# Patient Record
Sex: MALE | Race: WHITE | Employment: OTHER | URBAN - METROPOLITAN AREA
[De-identification: names, ages, dates, MRNs, and addresses within clinical notes are randomized per-mention and may not be internally consistent; named-entity substitution may affect disease eponyms.]

---

## 2017-03-14 ENCOUNTER — TELEPHONE (OUTPATIENT)
Dept: CARDIOLOGY CLINIC | Age: 67
End: 2017-03-14

## 2017-04-20 ENCOUNTER — OFFICE VISIT (OUTPATIENT)
Dept: CARDIOLOGY CLINIC | Age: 67
End: 2017-04-20

## 2017-04-20 VITALS
SYSTOLIC BLOOD PRESSURE: 130 MMHG | BODY MASS INDEX: 32.58 KG/M2 | DIASTOLIC BLOOD PRESSURE: 80 MMHG | HEART RATE: 52 BPM | HEIGHT: 68 IN | WEIGHT: 215 LBS

## 2017-04-20 DIAGNOSIS — R06.02 SHORTNESS OF BREATH: ICD-10-CM

## 2017-04-20 DIAGNOSIS — I10 ESSENTIAL HYPERTENSION: Chronic | ICD-10-CM

## 2017-04-20 DIAGNOSIS — E78.2 MIXED HYPERLIPIDEMIA: ICD-10-CM

## 2017-04-20 DIAGNOSIS — I65.23 BILATERAL CAROTID ARTERY STENOSIS: ICD-10-CM

## 2017-04-20 DIAGNOSIS — I42.9 CARDIOMYOPATHY (HCC): ICD-10-CM

## 2017-04-20 DIAGNOSIS — I25.10 CORONARY ARTERY DISEASE INVOLVING NATIVE CORONARY ARTERY OF NATIVE HEART WITHOUT ANGINA PECTORIS: Primary | Chronic | ICD-10-CM

## 2017-04-20 PROCEDURE — 93000 ELECTROCARDIOGRAM COMPLETE: CPT | Performed by: INTERNAL MEDICINE

## 2017-04-20 PROCEDURE — 99214 OFFICE O/P EST MOD 30 MIN: CPT | Performed by: INTERNAL MEDICINE

## 2017-04-20 RX ORDER — CLOPIDOGREL BISULFATE 75 MG/1
75 TABLET ORAL DAILY
Qty: 30 TABLET | Refills: 6 | Status: SHIPPED | OUTPATIENT
Start: 2017-04-20 | End: 2017-05-02

## 2017-04-28 ENCOUNTER — HOSPITAL ENCOUNTER (OUTPATIENT)
Dept: OTHER | Age: 67
Discharge: HOME | End: 2017-04-28
Attending: INTERNAL MEDICINE | Admitting: INTERNAL MEDICINE

## 2017-04-28 DIAGNOSIS — R06.02 SHORTNESS OF BREATH: ICD-10-CM

## 2017-04-28 DIAGNOSIS — I10 ESSENTIAL HYPERTENSION: Chronic | ICD-10-CM

## 2017-04-28 DIAGNOSIS — I42.9 CARDIOMYOPATHY (HCC): ICD-10-CM

## 2017-04-28 LAB
LEFT VENTRICULAR EJECTION FRACTION HIGH VALUE: 55 %
LEFT VENTRICULAR EJECTION FRACTION MODE: NORMAL
LV EF: 55 %
LVEF MODALITY: NORMAL

## 2017-05-02 RX ORDER — ROSUVASTATIN CALCIUM 10 MG/1
TABLET, COATED ORAL
Qty: 30 TABLET | Refills: 11 | Status: SHIPPED | OUTPATIENT
Start: 2017-05-02 | End: 2018-05-25 | Stop reason: SDUPTHER

## 2017-05-02 RX ORDER — CLOPIDOGREL BISULFATE 75 MG/1
75 TABLET ORAL DAILY
Qty: 30 TABLET | Refills: 3 | Status: SHIPPED | OUTPATIENT
Start: 2017-05-02 | End: 2017-08-14 | Stop reason: SDUPTHER

## 2017-05-04 ENCOUNTER — TELEPHONE (OUTPATIENT)
Dept: CARDIOLOGY CLINIC | Age: 67
End: 2017-05-04

## 2017-05-05 RX ORDER — CARVEDILOL 3.12 MG/1
TABLET ORAL
Qty: 60 TABLET | Refills: 11 | Status: SHIPPED | OUTPATIENT
Start: 2017-05-05 | End: 2017-05-08 | Stop reason: SDUPTHER

## 2017-05-08 RX ORDER — CARVEDILOL 3.12 MG/1
TABLET ORAL
Qty: 180 TABLET | Refills: 3 | Status: SHIPPED | OUTPATIENT
Start: 2017-05-08 | End: 2018-05-25 | Stop reason: SDUPTHER

## 2017-06-16 RX ORDER — LOSARTAN POTASSIUM 25 MG/1
TABLET ORAL
Qty: 90 TABLET | Refills: 3 | Status: SHIPPED | OUTPATIENT
Start: 2017-06-16 | End: 2018-08-22 | Stop reason: SDUPTHER

## 2017-07-31 DIAGNOSIS — I65.23 BILATERAL CAROTID ARTERY STENOSIS: Primary | ICD-10-CM

## 2017-08-04 ENCOUNTER — HOSPITAL ENCOUNTER (OUTPATIENT)
Dept: OTHER | Age: 67
Discharge: HOME | End: 2017-08-04
Attending: INTERNAL MEDICINE | Admitting: INTERNAL MEDICINE

## 2017-08-09 ENCOUNTER — TELEPHONE (OUTPATIENT)
Dept: CARDIOLOGY CLINIC | Age: 67
End: 2017-08-09

## 2017-08-14 RX ORDER — PRASUGREL 10 MG/1
TABLET, FILM COATED ORAL
Qty: 30 TABLET | Refills: 6 | Status: SHIPPED | OUTPATIENT
Start: 2017-08-14 | End: 2017-08-23

## 2017-08-14 RX ORDER — CLOPIDOGREL BISULFATE 75 MG/1
75 TABLET ORAL DAILY
Qty: 30 TABLET | Refills: 5 | Status: SHIPPED | OUTPATIENT
Start: 2017-08-14 | End: 2018-03-30 | Stop reason: SDUPTHER

## 2017-08-23 ENCOUNTER — OFFICE VISIT (OUTPATIENT)
Dept: CARDIOLOGY CLINIC | Age: 67
End: 2017-08-23

## 2017-08-23 VITALS
SYSTOLIC BLOOD PRESSURE: 112 MMHG | BODY MASS INDEX: 31.37 KG/M2 | WEIGHT: 207 LBS | HEART RATE: 58 BPM | DIASTOLIC BLOOD PRESSURE: 60 MMHG | HEIGHT: 68 IN

## 2017-08-23 DIAGNOSIS — E78.2 MIXED HYPERLIPIDEMIA: ICD-10-CM

## 2017-08-23 DIAGNOSIS — H34.9 RETINAL ARTERY OCCLUSION: ICD-10-CM

## 2017-08-23 DIAGNOSIS — I25.10 CORONARY ARTERY DISEASE INVOLVING NATIVE CORONARY ARTERY OF NATIVE HEART WITHOUT ANGINA PECTORIS: Primary | Chronic | ICD-10-CM

## 2017-08-23 DIAGNOSIS — I10 ESSENTIAL HYPERTENSION: Chronic | ICD-10-CM

## 2017-08-23 PROCEDURE — 99214 OFFICE O/P EST MOD 30 MIN: CPT | Performed by: INTERNAL MEDICINE

## 2017-08-28 ENCOUNTER — HOSPITAL ENCOUNTER (OUTPATIENT)
Dept: NON INVASIVE DIAGNOSTICS | Age: 67
Discharge: HOME | End: 2017-08-28
Attending: INTERNAL MEDICINE | Admitting: INTERNAL MEDICINE

## 2017-08-28 DIAGNOSIS — H34.9 RETINAL VASCULAR OCCLUSION: ICD-10-CM

## 2017-08-28 LAB
LV EF: 55 %
LVEF MODALITY: NORMAL

## 2017-09-12 ENCOUNTER — TELEPHONE (OUTPATIENT)
Dept: CARDIOLOGY CLINIC | Age: 67
End: 2017-09-12

## 2017-12-04 ENCOUNTER — OFFICE VISIT (OUTPATIENT)
Dept: SLEEP MEDICINE | Age: 67
End: 2017-12-04

## 2017-12-04 VITALS
OXYGEN SATURATION: 98 % | HEIGHT: 68 IN | DIASTOLIC BLOOD PRESSURE: 83 MMHG | SYSTOLIC BLOOD PRESSURE: 136 MMHG | HEART RATE: 65 BPM | BODY MASS INDEX: 31.07 KG/M2 | WEIGHT: 205 LBS

## 2017-12-04 DIAGNOSIS — I25.5 CARDIOMYOPATHY, ISCHEMIC: Chronic | ICD-10-CM

## 2017-12-04 DIAGNOSIS — E66.9 NON MORBID OBESITY, UNSPECIFIED OBESITY TYPE: Chronic | ICD-10-CM

## 2017-12-04 DIAGNOSIS — I10 HYPERTENSION, ESSENTIAL: Chronic | ICD-10-CM

## 2017-12-04 DIAGNOSIS — G47.33 OBSTRUCTIVE SLEEP APNEA SYNDROME: Primary | Chronic | ICD-10-CM

## 2017-12-04 DIAGNOSIS — I25.119 CORONARY ARTERY DISEASE INVOLVING NATIVE CORONARY ARTERY OF NATIVE HEART WITH ANGINA PECTORIS (HCC): Chronic | ICD-10-CM

## 2017-12-04 PROCEDURE — 99214 OFFICE O/P EST MOD 30 MIN: CPT | Performed by: NURSE PRACTITIONER

## 2017-12-04 ASSESSMENT — ENCOUNTER SYMPTOMS
ABDOMINAL DISTENTION: 0
COUGH: 0
SINUS PRESSURE: 0
SHORTNESS OF BREATH: 0
RHINORRHEA: 0
ABDOMINAL PAIN: 0
APNEA: 0

## 2017-12-04 ASSESSMENT — SLEEP AND FATIGUE QUESTIONNAIRES
HOW LIKELY ARE YOU TO NOD OFF OR FALL ASLEEP WHILE SITTING INACTIVE IN A PUBLIC PLACE: 0
HOW LIKELY ARE YOU TO NOD OFF OR FALL ASLEEP IN A CAR, WHILE STOPPED FOR A FEW MINUTES IN TRAFFIC: 0
HOW LIKELY ARE YOU TO NOD OFF OR FALL ASLEEP WHEN YOU ARE A PASSENGER IN A CAR FOR AN HOUR WITHOUT A BREAK: 1
HOW LIKELY ARE YOU TO NOD OFF OR FALL ASLEEP WHILE SITTING QUIETLY AFTER LUNCH WITHOUT ALCOHOL: 1
HOW LIKELY ARE YOU TO NOD OFF OR FALL ASLEEP WHILE WATCHING TV: 1
HOW LIKELY ARE YOU TO NOD OFF OR FALL ASLEEP WHILE SITTING AND TALKING TO SOMEONE: 0
HOW LIKELY ARE YOU TO NOD OFF OR FALL ASLEEP WHILE SITTING AND READING: 2
ESS TOTAL SCORE: 7
HOW LIKELY ARE YOU TO NOD OFF OR FALL ASLEEP WHILE LYING DOWN TO REST IN THE AFTERNOON WHEN CIRCUMSTANCES PERMIT: 2

## 2017-12-04 NOTE — PROGRESS NOTES
using the machine  na   Drowsy when driving  No   Does patient carry a DOT/CDL  No   Does patient carry FAA/Pilots License   No    Any concerns noted with the machine at this time   No       Review of Systems   Constitutional: Negative for appetite change, chills, fatigue and fever. HENT: Negative for congestion, nosebleeds, rhinorrhea and sinus pressure. Respiratory: Negative for apnea, cough and shortness of breath. Cardiovascular: Negative for chest pain and palpitations. Gastrointestinal: Negative for abdominal distention and abdominal pain. Neurological: Negative for dizziness and headaches. Social History     Social History    Marital status:      Spouse name: N/A    Number of children: N/A    Years of education: N/A     Occupational History    Not on file. Social History Main Topics    Smoking status: Never Smoker    Smokeless tobacco: Never Used    Alcohol use No    Drug use: Unknown    Sexual activity: Not on file     Other Topics Concern    Not on file     Social History Narrative    No narrative on file       Prior to Admission medications    Medication Sig Start Date End Date Taking? Authorizing Provider   clopidogrel (PLAVIX) 75 MG tablet Take 1 tablet by mouth daily If pt can not afford Effient he will take Plavix.  8/14/17  Yes Tonya Granda MD   losartan (COZAAR) 25 MG tablet TAKE ONE TABLET BY MOUTH ONCE DAILY 6/16/17  Yes Tonya Granda MD   carvedilol (COREG) 3.125 MG tablet TAKE 1 TABLET BY MOUTH TWICE DAILY WITH MEALS 5/8/17  Yes Tonya Granda MD   rosuvastatin (CRESTOR) 10 MG tablet TAKE ONE TABLET BY MOUTH ONCE DAILY 5/2/17  Yes Tonya Granda MD   levothyroxine (SYNTHROID) 137 MCG tablet 150 mcg  7/14/15  Yes Historical Provider, MD   aspirin 81 MG chewable tablet Take 81 mg by mouth daily   Yes Historical Provider, MD   nitroGLYCERIN (NITROSTAT) 0.4 MG SL tablet Place 1 tablet under the tongue every 5 minutes as needed for Chest pain 7/21/15   Aspen Guerrero MD       Allergies as of 12/04/2017 - Review Complete 12/04/2017   Allergen Reaction Noted    Lipitor [atorvastatin]  07/08/2015    Niacin and related  07/08/2015    Pravachol [pravastatin sodium]  10/07/2016       Patient Active Problem List   Diagnosis    Carotid stenosis    CAD (coronary artery disease)    Hypertension    Hyperlipidemia    Biceps tendinitis    Subacromial impingement of right shoulder    Osteoarthritis, shoulder    Rotator cuff tendonitis    Right shoulder pain    Myocardial infarction within last four weeks    Cardiomyopathy, ischemic    Ischemic cardiomyopathy    Post-traumatic osteoarthritis of right shoulder    Obstructive apnea       Past Medical History:   Diagnosis Date    CAD (coronary artery disease)     Hyperlipidemia     Hypertension     Obstructive apnea     Swallowing difficulty     pt to f/u with an ENT    Thyroid disease        Past Surgical History:   Procedure Laterality Date    CORONARY ANGIOPLASTY WITH STENT PLACEMENT      4 placed    ELBOW SURGERY      2 on right    FOOT SURGERY Left 01/15/2013    ENDOSCOPIC PLANTAR FASCIOTOMY LEFT        FOOT SURGERY Left 01.15.2012    ENDOSCOPIC PLANTAR FASCIOTOMY LEFT        KNEE ARTHROSCOPY      left       Family History   Problem Relation Age of Onset    Heart Failure Mother     Liver Disease Mother     Alzheimer's Disease Mother        Vitals:  Weight BMI   Wt Readings from Last 3 Encounters:   12/04/17 205 lb (93 kg)   08/23/17 207 lb (93.9 kg)   04/20/17 215 lb (97.5 kg)    Body mass index is 31.17 kg/m². BP HR SaO2   BP Readings from Last 3 Encounters:   12/04/17 136/83   08/23/17 112/60   04/20/17 130/80    Pulse Readings from Last 3 Encounters:   12/04/17 65   08/23/17 58   04/20/17 52    SpO2 Readings from Last 3 Encounters:   12/04/17 98%   12/19/16 98%   08/23/16 98%        Assessment:     1. Obstructive sleep apnea syndrome Stable    2.  Coronary artery disease involving native coronary artery of native heart with angina pectoris (HCC) Stable    3. Cardiomyopathy, ischemic Stable    4. Hypertension, essential Stable    5. Non morbid obesity, unspecified obesity type Stable        The chronic medical conditions listed are directly related to the primary diagnosis listed above. The management of the primary diagnosis affects the secondary diagnosis and vice versa. Plan:   - Educated patient and reviewed compliance download with pt.    -Supplies and parts as needed for his machine, these are medically necessary.    - Patient using [employer] for supplies  -Continue medications per his PCP and other physicians.   -Limit caffeine use after 3pm.    -Encouraged him to work on weight loss through diet and exercise. - Will monitor the AHI may be a miss read or machine error  - Psmin 2 Psmax 4 Humidity 3 tube temperature 3  -F/U: 12 month. No orders of the defined types were placed in this encounter. No orders of the defined types were placed in this encounter. No orders of the defined types were placed in this encounter.       Janet Aly, MYNOR, RN, CNP

## 2018-01-23 ENCOUNTER — TELEPHONE (OUTPATIENT)
Dept: CARDIOLOGY CLINIC | Age: 68
End: 2018-01-23

## 2018-01-23 DIAGNOSIS — R06.02 SOB (SHORTNESS OF BREATH): Primary | ICD-10-CM

## 2018-01-23 DIAGNOSIS — I25.10 CORONARY ARTERY DISEASE INVOLVING NATIVE CORONARY ARTERY OF NATIVE HEART WITHOUT ANGINA PECTORIS: Chronic | ICD-10-CM

## 2018-01-23 DIAGNOSIS — R53.83 FATIGUE, UNSPECIFIED TYPE: ICD-10-CM

## 2018-01-24 ENCOUNTER — HOSPITAL ENCOUNTER (OUTPATIENT)
Dept: NON INVASIVE DIAGNOSTICS | Age: 68
Discharge: HOME | End: 2018-01-24
Attending: INTERNAL MEDICINE | Admitting: INTERNAL MEDICINE

## 2018-01-24 DIAGNOSIS — R06.02 SHORTNESS OF BREATH: ICD-10-CM

## 2018-01-24 PROBLEM — I20.0 UNSTABLE ANGINA (HCC): Status: ACTIVE | Noted: 2018-01-24

## 2018-01-24 LAB
LV EF: 47 %
LVEF MODALITY: NORMAL

## 2018-01-24 NOTE — PROGRESS NOTES
Pt had cp on GXT along with arm pain and EKG changes. Dr. Nelida Madrigal reviewed EKGs and cath report in past. Will do myoview stress pictures and do angiogram after stress pictures complete.

## 2018-02-05 ENCOUNTER — TELEPHONE (OUTPATIENT)
Dept: CARDIOTHORACIC SURGERY | Age: 68
End: 2018-02-05

## 2018-02-08 ENCOUNTER — OFFICE VISIT (OUTPATIENT)
Dept: CARDIOTHORACIC SURGERY | Age: 68
End: 2018-02-08

## 2018-02-08 VITALS
WEIGHT: 202 LBS | HEART RATE: 63 BPM | HEIGHT: 68 IN | DIASTOLIC BLOOD PRESSURE: 74 MMHG | SYSTOLIC BLOOD PRESSURE: 122 MMHG | BODY MASS INDEX: 30.62 KG/M2 | OXYGEN SATURATION: 98 % | TEMPERATURE: 98.3 F

## 2018-02-08 DIAGNOSIS — Z95.1 S/P CABG (CORONARY ARTERY BYPASS GRAFT): Primary | ICD-10-CM

## 2018-02-08 PROCEDURE — 99024 POSTOP FOLLOW-UP VISIT: CPT | Performed by: THORACIC SURGERY (CARDIOTHORACIC VASCULAR SURGERY)

## 2018-02-08 NOTE — PROGRESS NOTES
S/p CABG x4 on 1/29/18     Midsternal incision , CT site and L leg incision all healing well,  CT stitch removed without difficulty    Only problem is constipaiton, using laxatives.  Is getting relief    Has appointment with Radha COURTNEY

## 2018-02-08 NOTE — PATIENT INSTRUCTIONS
PREVENTION OF RECURRENT ATHEROSCLEROSIS:  A MESSAGE TO PATIENTS AND THEIR LOVED ONES FROM YOUR SURGEON    You have recently had successful surgery for atherosclerosis. Now your real work begins. Atherosclerosis (hardening of the arteries by cholesterol and fat deposits) can be slowed or stopped from further blocking your own arteries or the new bypass grafts by following \"risk factor management\". If you follow these principles carefully, you can reduce your chances of having heart attacks, strokes, and the need for future surgery. Your cardiologist and primary care doctor should follow these concepts, but your surgeons believe that this is so important that we want you to begin thinking about and following these concepts now. These are the important risk factors you and your doctors should follow carefully. The marked ones apply to YOU:    [] SMOKING: Absolutely positively never smoke again. Keep your home and work place smoke­ free. Your doctors can prescribe patches, gum or other medications to help. [x] BLOOD PRESSURE CONTROL: Your blood pressure should be less than 140/90. If you have diabetes or kidney disease, your blood pressure should be less than 130/80. Weight reduction, exercise and medications can help you control high blood pressure. [x] CHOLESTEROL AND FATS: A diet low in saturated fat (< 7%) and low in cholesterol (< 200 mg/day), and weight reduction, and exercise, and medications as necessary can help you achieve these goals:  Low density (bad) cholesterol: <70 mg/dL (the lower, the better)   Triglycerides: <150 mg/dL (the lower, the better)   High density (good) cholesterol: >40 mg/dL (the higher, the better)  Make an appointment to see your primary care physician, Dr. Felicitas Chavarria 2 months after your surgery to check your cholesterol profile.   [x] EXERCISE: Your cardiac rehabilitation program will help you work up to a regular make you sweat\" program of at least 30 minutes, at least 6 times per week, preferably daily. Make an appointment with your cardiologist Dr. Goyo Iglesias 3-4 weeks after your surgery. [x] WEIGHT REDUCTION: Your ideal body mass index is 18.5-24.9 kg/m2. Your body mass index is Body mass index is 30.71 kg/m². . You may calculate this by using the following formula: (weight in pounds X 0.45) / (height in inches X 0.0254) squared. A waist circumference of < 40 inches for men and < 35 inches for women is recommended. A 10% weight reduction can lower your risk of future events. [] DIABETES: Your HbA1c should be <7%. Diet, exercise, weight reduction and proper medications can reduce your body's tendency toward high blood sugar. Check with your PCP for assistance. [x] PROPER MEDICATIONS:  [x] Aspirin  [x] ACE inhibitor / ARB  (-opril / -sartan)  [x] Beta-blocker (-olol or -ilol)  [x] Statin    Risk factor management works. The person for whom this is most important is YOU. Post this information on your refrigerator or other prominent place as a reminder to you. Please call or write if you have further questions. We want you and your operation to last for many more years.     MAY LIFT MORE THAN 5 LBS ON 3/29/18

## 2018-02-09 ENCOUNTER — HOSPITAL ENCOUNTER (OUTPATIENT)
Dept: OTHER | Age: 68
Discharge: HOME | End: 2018-02-09
Attending: NURSE PRACTITIONER | Admitting: NURSE PRACTITIONER

## 2018-02-09 ENCOUNTER — OFFICE VISIT (OUTPATIENT)
Dept: CARDIOLOGY CLINIC | Age: 68
End: 2018-02-09

## 2018-02-09 VITALS
WEIGHT: 203.8 LBS | DIASTOLIC BLOOD PRESSURE: 70 MMHG | SYSTOLIC BLOOD PRESSURE: 118 MMHG | HEIGHT: 68 IN | BODY MASS INDEX: 30.89 KG/M2 | HEART RATE: 64 BPM

## 2018-02-09 DIAGNOSIS — I10 ESSENTIAL HYPERTENSION, BENIGN: ICD-10-CM

## 2018-02-09 DIAGNOSIS — I25.10 CORONARY ARTERY DISEASE INVOLVING NATIVE CORONARY ARTERY OF NATIVE HEART WITHOUT ANGINA PECTORIS: Primary | Chronic | ICD-10-CM

## 2018-02-09 DIAGNOSIS — I25.10 CORONARY ARTERY DISEASE INVOLVING NATIVE CORONARY ARTERY OF NATIVE HEART WITHOUT ANGINA PECTORIS: Chronic | ICD-10-CM

## 2018-02-09 DIAGNOSIS — E78.2 MIXED HYPERLIPIDEMIA: ICD-10-CM

## 2018-02-09 LAB
ANION GAP SERPL CALCULATED.3IONS-SCNC: 14 MMOL/L (ref 3–16)
BUN BLDV-MCNC: 19 MG/DL (ref 7–20)
CALCIUM SERPL-MCNC: 9.4 MG/DL (ref 8.3–10.6)
CHLORIDE BLD-SCNC: 105 MMOL/L (ref 99–110)
CO2: 23 MMOL/L (ref 21–32)
CREAT SERPL-MCNC: 1.4 MG/DL (ref 0.8–1.3)
GFR AFRICAN AMERICAN: >60
GFR NON-AFRICAN AMERICAN: 50
GLUCOSE BLD-MCNC: 115 MG/DL (ref 70–99)
HCT VFR BLD CALC: 35.4 % (ref 40.5–52.5)
HEMOGLOBIN: 11.7 G/DL (ref 13.5–17.5)
MAGNESIUM: 2.3 MG/DL (ref 1.8–2.4)
MCH RBC QN AUTO: 30.8 PG (ref 26–34)
MCHC RBC AUTO-ENTMCNC: 33 G/DL (ref 31–36)
MCV RBC AUTO: 93.4 FL (ref 80–100)
PDW BLD-RTO: 14.6 % (ref 12.4–15.4)
PLATELET # BLD: 391 K/UL (ref 135–450)
PMV BLD AUTO: 6.7 FL (ref 5–10.5)
POTASSIUM SERPL-SCNC: 4.8 MMOL/L (ref 3.5–5.1)
RBC # BLD: 3.79 M/UL (ref 4.2–5.9)
SODIUM BLD-SCNC: 142 MMOL/L (ref 136–145)
WBC # BLD: 8 K/UL (ref 4–11)

## 2018-02-09 PROCEDURE — 1111F DSCHRG MED/CURRENT MED MERGE: CPT | Performed by: NURSE PRACTITIONER

## 2018-02-09 PROCEDURE — G8484 FLU IMMUNIZE NO ADMIN: HCPCS | Performed by: NURSE PRACTITIONER

## 2018-02-09 PROCEDURE — 1123F ACP DISCUSS/DSCN MKR DOCD: CPT | Performed by: NURSE PRACTITIONER

## 2018-02-09 PROCEDURE — 1036F TOBACCO NON-USER: CPT | Performed by: NURSE PRACTITIONER

## 2018-02-09 PROCEDURE — G8427 DOCREV CUR MEDS BY ELIG CLIN: HCPCS | Performed by: NURSE PRACTITIONER

## 2018-02-09 PROCEDURE — 99214 OFFICE O/P EST MOD 30 MIN: CPT | Performed by: NURSE PRACTITIONER

## 2018-02-09 PROCEDURE — 93000 ELECTROCARDIOGRAM COMPLETE: CPT | Performed by: NURSE PRACTITIONER

## 2018-02-09 PROCEDURE — G8598 ASA/ANTIPLAT THER USED: HCPCS | Performed by: NURSE PRACTITIONER

## 2018-02-09 PROCEDURE — 3017F COLORECTAL CA SCREEN DOC REV: CPT | Performed by: NURSE PRACTITIONER

## 2018-02-09 PROCEDURE — 4040F PNEUMOC VAC/ADMIN/RCVD: CPT | Performed by: NURSE PRACTITIONER

## 2018-02-09 PROCEDURE — G8417 CALC BMI ABV UP PARAM F/U: HCPCS | Performed by: NURSE PRACTITIONER

## 2018-02-09 NOTE — PROGRESS NOTES
pantoprazole (PROTONIX) 40 MG tablet Take 1 tablet by mouth daily 30 tablet 0    furosemide (LASIX) 40 MG tablet Take 1 tablet by mouth daily for 3 days 3 tablet 0    clopidogrel (PLAVIX) 75 MG tablet Take 1 tablet by mouth daily If pt can not afford Effient he will take Plavix. 30 tablet 5    losartan (COZAAR) 25 MG tablet TAKE ONE TABLET BY MOUTH ONCE DAILY 90 tablet 3    carvedilol (COREG) 3.125 MG tablet TAKE 1 TABLET BY MOUTH TWICE DAILY WITH MEALS 180 tablet 3    rosuvastatin (CRESTOR) 10 MG tablet TAKE ONE TABLET BY MOUTH ONCE DAILY 30 tablet 11    levothyroxine (SYNTHROID) 137 MCG tablet 150 mcg       aspirin 81 MG chewable tablet Take 81 mg by mouth daily       No current facility-administered medications for this visit. REVIEW OF SYSTEMS:   CONSTITUTIONAL: No major weight gain or loss, fatigue, weakness, night sweats or fever. There's been no change in energy level, sleep pattern, or activity level. HEENT: No new vision difficulties or ringing in the ears. RESPIRATORY: No new SOB, PND, orthopnea or cough. CARDIOVASCULAR: See HPI  GI: No nausea, vomiting, diarrhea, constipation, abdominal pain or changes in bowel habits. : No urinary frequency, urgency, incontinence hematuria or dysuria. SKIN: No cyanosis or skin lesions. MUSCULOSKELETAL: No new muscle or joint pain. NEUROLOGICAL: No syncope or TIA-like symptoms. PSYCHIATRIC: No anxiety, pain, insomnia or depression    Objective:   PHYSICAL EXAM:        VITALS:    Vitals:    02/09/18 1116   BP: 118/70   Pulse: 64         CONSTITUTIONAL: Cooperative, no apparent distress, and appears well nourished / developed  NEUROLOGIC:  Awake and orientated to person, place and time. PSYCH: Calm affect. SKIN: Warm and dry. HEENT: Sclera non-icteric, normocephalic, neck supple, no elevation of JVP, normal carotid pulses with no bruits and thyroid normal size.   LUNGS:  No increased work of breathing and clear to auscultation, no crackles or evaluation will be based upon the patient's clinical course and testing results. All questions and concerns were addressed to the patient/family. Alternatives to  treatment were discussed. The patient  currently  is not smoking. The risks related to smoking were reviewed with the patient. Recommend maintaining a smoke-free lifestyle. Products available for smoking cessation were discussed. Daily weight, low sodium diet were discussed. Patient instructed to call the office with a weight gain: > 3 lbs over night or 5 lbs in one week; swelling, SOB/orthopnea/PND    Dual Antiplatelet therapy has been prescribed for this patient. Education conducted on adverse reactions including bleeding was discussed. The patient verbalizes understanding. Pt is on a BB  Pt is on an ace-i/ARB  Pt is on a statin    Saturated fat diet discussed  Exercise program discussed    Thank you for allowing to us to participate in the care of Onofre Monique CNP

## 2018-02-12 ENCOUNTER — TELEPHONE (OUTPATIENT)
Dept: SLEEP MEDICINE | Age: 68
End: 2018-02-12

## 2018-02-12 NOTE — TELEPHONE ENCOUNTER
Patient recently had heart surgery and has not been able to wear machine since having the surgery. Patient feels that the pressure is too high now. He would like to come in today to bring his machine in if needed. Please call.

## 2018-02-13 ENCOUNTER — TELEPHONE (OUTPATIENT)
Dept: SLEEP MEDICINE | Age: 68
End: 2018-02-13

## 2018-02-13 NOTE — TELEPHONE ENCOUNTER
Pt in with unit c/o pressure being too much post surgery,  (cardiac bypass). Download was reviewed per  Dennie Gunning NP and changes made to pressure min epap 5- min ps 1- max ps 3.  Pt to call if problems persist

## 2018-03-15 ENCOUNTER — HOSPITAL ENCOUNTER (OUTPATIENT)
Dept: CARDIAC REHAB | Age: 68
Discharge: HOME | End: 2018-03-31
Attending: FAMILY MEDICINE | Admitting: INTERNAL MEDICINE

## 2018-03-30 RX ORDER — CLOPIDOGREL BISULFATE 75 MG/1
TABLET ORAL
Qty: 30 TABLET | Refills: 11 | Status: SHIPPED | OUTPATIENT
Start: 2018-03-30 | End: 2019-01-23 | Stop reason: SDUPTHER

## 2018-04-01 ENCOUNTER — HOSPITAL ENCOUNTER (OUTPATIENT)
Dept: OTHER | Age: 68
Discharge: HOME | End: 2018-04-30
Attending: INTERNAL MEDICINE | Admitting: INTERNAL MEDICINE

## 2018-04-05 ENCOUNTER — OFFICE VISIT (OUTPATIENT)
Dept: CARDIOTHORACIC SURGERY | Age: 68
End: 2018-04-05

## 2018-04-05 VITALS
DIASTOLIC BLOOD PRESSURE: 84 MMHG | SYSTOLIC BLOOD PRESSURE: 130 MMHG | HEIGHT: 68 IN | BODY MASS INDEX: 31.98 KG/M2 | HEART RATE: 63 BPM | OXYGEN SATURATION: 98 % | TEMPERATURE: 98 F | WEIGHT: 211 LBS

## 2018-04-05 DIAGNOSIS — Z95.1 S/P CABG (CORONARY ARTERY BYPASS GRAFT): Primary | ICD-10-CM

## 2018-04-05 PROCEDURE — 99024 POSTOP FOLLOW-UP VISIT: CPT | Performed by: THORACIC SURGERY (CARDIOTHORACIC VASCULAR SURGERY)

## 2018-04-10 ENCOUNTER — TELEPHONE (OUTPATIENT)
Dept: SLEEP MEDICINE | Age: 68
End: 2018-04-10

## 2018-05-01 ENCOUNTER — HOSPITAL ENCOUNTER (OUTPATIENT)
Dept: OTHER | Age: 68
Discharge: HOME | End: 2018-05-31
Attending: INTERNAL MEDICINE | Admitting: INTERNAL MEDICINE

## 2018-05-17 ENCOUNTER — OFFICE VISIT (OUTPATIENT)
Dept: CARDIOLOGY CLINIC | Age: 68
End: 2018-05-17

## 2018-05-17 VITALS
WEIGHT: 206 LBS | HEIGHT: 68 IN | HEART RATE: 60 BPM | DIASTOLIC BLOOD PRESSURE: 80 MMHG | SYSTOLIC BLOOD PRESSURE: 140 MMHG | BODY MASS INDEX: 31.22 KG/M2

## 2018-05-17 DIAGNOSIS — I25.10 CORONARY ARTERY DISEASE INVOLVING NATIVE CORONARY ARTERY OF NATIVE HEART WITHOUT ANGINA PECTORIS: Primary | Chronic | ICD-10-CM

## 2018-05-17 DIAGNOSIS — I10 ESSENTIAL HYPERTENSION, BENIGN: ICD-10-CM

## 2018-05-17 DIAGNOSIS — E78.2 MIXED HYPERLIPIDEMIA: ICD-10-CM

## 2018-05-17 PROCEDURE — 99214 OFFICE O/P EST MOD 30 MIN: CPT | Performed by: INTERNAL MEDICINE

## 2018-05-25 ENCOUNTER — TELEPHONE (OUTPATIENT)
Dept: CARDIOLOGY CLINIC | Age: 68
End: 2018-05-25

## 2018-05-25 RX ORDER — CARVEDILOL 3.12 MG/1
TABLET ORAL
Qty: 180 TABLET | Refills: 3 | Status: SHIPPED | OUTPATIENT
Start: 2018-05-25 | End: 2018-11-14 | Stop reason: SDUPTHER

## 2018-05-25 RX ORDER — ROSUVASTATIN CALCIUM 10 MG/1
10 TABLET, COATED ORAL DAILY
Qty: 90 TABLET | Refills: 3 | Status: SHIPPED | OUTPATIENT
Start: 2018-05-25 | End: 2018-11-14 | Stop reason: SDUPTHER

## 2018-06-01 ENCOUNTER — HOSPITAL ENCOUNTER (OUTPATIENT)
Dept: OTHER | Age: 68
Discharge: HOME | End: 2018-06-30
Attending: INTERNAL MEDICINE | Admitting: INTERNAL MEDICINE

## 2018-07-01 ENCOUNTER — HOSPITAL ENCOUNTER (OUTPATIENT)
Dept: OTHER | Age: 68
Discharge: HOME | End: 2018-07-31
Attending: INTERNAL MEDICINE | Admitting: INTERNAL MEDICINE

## 2018-08-23 RX ORDER — LOSARTAN POTASSIUM 25 MG/1
TABLET ORAL
Qty: 90 TABLET | Refills: 3 | Status: ON HOLD | OUTPATIENT
Start: 2018-08-23 | End: 2019-08-25 | Stop reason: SDUPTHER

## 2018-09-12 ENCOUNTER — OFFICE VISIT (OUTPATIENT)
Dept: CARDIOLOGY CLINIC | Age: 68
End: 2018-09-12

## 2018-09-12 VITALS
HEART RATE: 63 BPM | BODY MASS INDEX: 30.92 KG/M2 | DIASTOLIC BLOOD PRESSURE: 70 MMHG | HEIGHT: 68 IN | WEIGHT: 204 LBS | SYSTOLIC BLOOD PRESSURE: 118 MMHG

## 2018-09-12 DIAGNOSIS — Z95.1 S/P CABG X 4: ICD-10-CM

## 2018-09-12 DIAGNOSIS — R00.2 HEART PALPITATIONS: ICD-10-CM

## 2018-09-12 DIAGNOSIS — I10 ESSENTIAL HYPERTENSION, BENIGN: Primary | ICD-10-CM

## 2018-09-12 PROCEDURE — 3017F COLORECTAL CA SCREEN DOC REV: CPT | Performed by: NURSE PRACTITIONER

## 2018-09-12 PROCEDURE — G8417 CALC BMI ABV UP PARAM F/U: HCPCS | Performed by: NURSE PRACTITIONER

## 2018-09-12 PROCEDURE — G8427 DOCREV CUR MEDS BY ELIG CLIN: HCPCS | Performed by: NURSE PRACTITIONER

## 2018-09-12 PROCEDURE — 93000 ELECTROCARDIOGRAM COMPLETE: CPT | Performed by: NURSE PRACTITIONER

## 2018-09-12 PROCEDURE — 1036F TOBACCO NON-USER: CPT | Performed by: NURSE PRACTITIONER

## 2018-09-12 PROCEDURE — G8598 ASA/ANTIPLAT THER USED: HCPCS | Performed by: NURSE PRACTITIONER

## 2018-09-12 PROCEDURE — 1123F ACP DISCUSS/DSCN MKR DOCD: CPT | Performed by: NURSE PRACTITIONER

## 2018-09-12 PROCEDURE — 99214 OFFICE O/P EST MOD 30 MIN: CPT | Performed by: NURSE PRACTITIONER

## 2018-09-12 PROCEDURE — 4040F PNEUMOC VAC/ADMIN/RCVD: CPT | Performed by: NURSE PRACTITIONER

## 2018-09-12 PROCEDURE — 1101F PT FALLS ASSESS-DOCD LE1/YR: CPT | Performed by: NURSE PRACTITIONER

## 2018-09-12 NOTE — PROGRESS NOTES
Problem: Knowledge Deficit  Goal: Knowledge of disease process/condition, treatment plan, diagnostic tests, and medications will improve  Outcome: PROGRESSING SLOWER THAN EXPECTED  Assess knowledge level of disease process, condition, treatment plan, diagnostic testing, and medications and explain information regarding those subjects.     Problem: Mobility  Goal: Risk for activity intolerance will decrease  Outcome: PROGRESSING SLOWER THAN EXPECTED  Pt has become more lethargic and weak with the medication regime she is on. Improvement today but still weaker than baseline. Will assess and monitor signs of activity intolerance and provide rest periods to patient.        [facility]   Cardiac Evaluation      Patient: Clau Clemons  YOB: 1950  Date: 9/12/18       Chief Complaint   Patient presents with    Coronary Artery Disease    Hyperlipidemia    Hypertension    Palpitations        Referring provider: Eva Mckay MD    History of Present Illness:   Mr Rolo Quinones is seen today for an acute visit. He feels like he has a feeling of palpitations that occur almost daily, mostly when lying still, when doing his karate he does not notice this,  No other symptoms with this. Sveta Lawler He was initially seen for re-evaluation of his coronary disease in 2012. At that time he had 4 stents placed at [facility] in 2005. He states he did not have an MI, did not have chest pain. His symptoms at that time were fatigue. He was transferred to [facility] after having an angiogram at [facility]. Dr Christopher Mishra performed his initial angiogram. Karan's other history includes hypertension and hyperlipidemia. Selvin Castellon states he has had several nuclear stress tests in Dr Karl Packer office since his event in 2005. He also has yearly echo's and carotid dopplers through Dr Donna Davey. He does not smoke or drink alcohol. June 29, 2015 Mr Rolo Quinones was on vacation. He developed chest pain, . He was diagnosed with acute MI and an angiogram was performed with 3 stents placed to LCX and OM2.      angiogram 7/10/15 that revealed EF 40%, LAD - diffuse disease with long stented area in the prox and mid vessel with diffuse instent 40%, distal apical 90%, LCX - 40% ostial, 30% before LCX stent, 40% just prox to large PL branch, bifurcation stents in the LCX-OM3 junction which are patent. OM1 small with 90% ostial, RCA - dom, diffusely diseased, 50%prox, long stented area with localized 60% stenosis, 50% between stents, 2nd stent prox to PDA is patent, PDA - prox 50%. He then had a plain stress test for rehab and participated in rehab. January, 2018 he was admitted to [facility] after a grossly abnormal gxt.  Cath performed pruritis. · Neurological: No headache, change in muscle strength, numbness or tingling. No change in gait, balance, coordination, mood, affect, memory, mentation, behavior. · Psychiatric: anxious   · Endocrine: No malaise or fever  · Hematologic/Lymphatic: No abnormal bruising or bleeding, blood clots or swollen lymph nodes. · Allergic/Immunologic: No nasal congestion or hives. Physical Examination:    Vitals:    09/12/18 1431   BP: 118/70   Site: Left Upper Arm   Position: Sitting   Cuff Size: Medium Adult   Pulse: 63   Weight: 204 lb (92.5 kg)   Height: 5' 8\" (1.727 m)     Body mass index is 31.02 kg/m². Wt Readings from Last 3 Encounters:   09/12/18 204 lb (92.5 kg)   05/17/18 206 lb (93.4 kg)   04/05/18 211 lb (95.7 kg)      BP Readings from Last 3 Encounters:   09/12/18 118/70   05/17/18 (!) 140/80   04/05/18 130/84      Constitutional and General Appearance:  appears stated age  Respiratory:  · Normal excursion and expansion without use of accessory muscles  · Resp Auscultation: Normal breath sounds without dullness  Cardiovascular:  · The apical impulses not displaced  · Heart is regular rate and rhythm with normal S1, S2, EKG today reviewed by me NSR  · The carotid upstroke is normal, no bruit noted   · JVP is not elevated  · Peripheral pulses are symmetrical  · There is no clubbing, cyanosis of the extremities  · No edema  · Femoral Arteries: 2+ and equal  · Pedal Pulses: 2+ and equal   Abdomen:  · No masses or tenderness  · Normal bowel sounds  Neurological/Psychiatric:  · Alert and oriented x3  · Moves all extremities well  · Exhibits normal gait balance and coordination      Assessment/Plan  1. Carotid stenosis: stable, doppler 7/12> <50% stenosis bilateral, repeat doppler 8/4/17: 0% bilateral   2. CAD (coronary artery disease): stable  CABG 1/29/18: CABG x 4 LIMA-LAD, SV-PDA, SV-Diag-OM2  Cath 1/24/18: LVEDP - 5.  LVgram - mild inferior hypokinesis, EF 50%  Cors: LM - nl, LAD - proximal stents (2) with instent stenosis up to 95%, 70% between dx2 and dx3  DX1 - totalled with faint filling, DX2 moderate in caliber, RI - small with 90% ostial, LCX - 40% ostial, 50% between om1 and om2, mid stent and stent in OM2, 40% after mid stent, OM3 70%   RCA - dominant with 50% prox, long stented mid vessel with instent stenoses of 50-80%  Ff by 90% with another stent at RCA/PDA with instent restenosis of 95% Faint r-l collateral to the area of dx1  GXT 1/24/18: medium sized inferolateral fixed defect of moderate intensity consistent w/ infarction in territory of LCx and/or RCA. Small to medium sized anterior completely reversible defect of moderate intensity consistent with ischemia in territory of LAD. Small to medium sized inferolateral completely reversible defect of moderate intensity consistent with ischemia in Lcx and/or RCA. EF 47%. Referred to angiogram  Echo 4/28/17: EF 55%. There is inferior hypokinesis. Mild cLVH. Diastolic filling parameters suggests grade I diastolic dysfunction. Mild mitral annular calcification is present. Aortic valve appears sclerotic but opens adequately. trivial mitral, pulmonic and tricuspid regurgitation with RVSP 25 mmHg. Cath 7/10/15: LVEDP - 16, EF 40%  Cors: LM - nl. LAD - diffuse disease with long stented area in the prox and mid vessel with diffuse instent 40%, distal apical 90%. LCX - 40% ostial, 30% before LCX stent, 40% just prox to large PL branch, bifurcation stents in the LCX-OM3 junction which are patent. OM1 small with 90% ostial  RCA - dom, diffusely diseased, 50%prox, long stented area with localized 60% stenosis, 50% between stents, 2nd stent prox to PDA is patent, PDA - prox 50%  FFR of RCA through all diseased segments was 0.85 ff 2 min of adenosine  Echo 6/2915 [facility]) [id number], mildly reduced EF 50-55%, right ventricle mildly dilated, right atrium mildly dilated, left ventricular posterior hypokinesis  Cath 6/29/15 ([place]): right radial artery approach.

## 2018-09-20 PROCEDURE — 93225 XTRNL ECG REC<48 HRS REC: CPT | Performed by: INTERNAL MEDICINE

## 2018-11-14 RX ORDER — ROSUVASTATIN CALCIUM 10 MG/1
10 TABLET, COATED ORAL DAILY
Qty: 90 TABLET | Refills: 3 | Status: CANCELLED | OUTPATIENT
Start: 2018-11-14

## 2018-11-14 RX ORDER — ROSUVASTATIN CALCIUM 10 MG/1
10 TABLET, COATED ORAL DAILY
Qty: 90 TABLET | Refills: 3 | Status: SHIPPED | OUTPATIENT
Start: 2018-11-14 | End: 2020-03-06

## 2018-11-14 RX ORDER — CARVEDILOL 3.12 MG/1
TABLET ORAL
Qty: 180 TABLET | Refills: 3 | Status: SHIPPED | OUTPATIENT
Start: 2018-11-14 | End: 2019-11-22 | Stop reason: SDUPTHER

## 2018-11-29 ENCOUNTER — OFFICE VISIT (OUTPATIENT)
Dept: CARDIOLOGY CLINIC | Age: 68
End: 2018-11-29
Payer: MEDICARE

## 2018-11-29 VITALS
BODY MASS INDEX: 30.92 KG/M2 | HEART RATE: 62 BPM | WEIGHT: 204 LBS | HEIGHT: 68 IN | SYSTOLIC BLOOD PRESSURE: 136 MMHG | DIASTOLIC BLOOD PRESSURE: 82 MMHG

## 2018-11-29 DIAGNOSIS — G89.29 CHRONIC PAIN OF BOTH KNEES: ICD-10-CM

## 2018-11-29 DIAGNOSIS — M25.562 CHRONIC PAIN OF BOTH KNEES: ICD-10-CM

## 2018-11-29 DIAGNOSIS — I10 ESSENTIAL HYPERTENSION, BENIGN: ICD-10-CM

## 2018-11-29 DIAGNOSIS — M25.561 CHRONIC PAIN OF BOTH KNEES: ICD-10-CM

## 2018-11-29 DIAGNOSIS — I25.5 CARDIOMYOPATHY, ISCHEMIC: Chronic | ICD-10-CM

## 2018-11-29 DIAGNOSIS — E78.2 MIXED HYPERLIPIDEMIA: ICD-10-CM

## 2018-11-29 DIAGNOSIS — I25.10 CORONARY ARTERY DISEASE INVOLVING NATIVE CORONARY ARTERY OF NATIVE HEART WITHOUT ANGINA PECTORIS: Primary | Chronic | ICD-10-CM

## 2018-11-29 PROCEDURE — G8598 ASA/ANTIPLAT THER USED: HCPCS | Performed by: INTERNAL MEDICINE

## 2018-11-29 PROCEDURE — G8427 DOCREV CUR MEDS BY ELIG CLIN: HCPCS | Performed by: INTERNAL MEDICINE

## 2018-11-29 PROCEDURE — G8482 FLU IMMUNIZE ORDER/ADMIN: HCPCS | Performed by: INTERNAL MEDICINE

## 2018-11-29 PROCEDURE — 1036F TOBACCO NON-USER: CPT | Performed by: INTERNAL MEDICINE

## 2018-11-29 PROCEDURE — 1123F ACP DISCUSS/DSCN MKR DOCD: CPT | Performed by: INTERNAL MEDICINE

## 2018-11-29 PROCEDURE — 3017F COLORECTAL CA SCREEN DOC REV: CPT | Performed by: INTERNAL MEDICINE

## 2018-11-29 PROCEDURE — 1101F PT FALLS ASSESS-DOCD LE1/YR: CPT | Performed by: INTERNAL MEDICINE

## 2018-11-29 PROCEDURE — 99214 OFFICE O/P EST MOD 30 MIN: CPT | Performed by: INTERNAL MEDICINE

## 2018-11-29 PROCEDURE — G8417 CALC BMI ABV UP PARAM F/U: HCPCS | Performed by: INTERNAL MEDICINE

## 2018-11-29 PROCEDURE — 4040F PNEUMOC VAC/ADMIN/RCVD: CPT | Performed by: INTERNAL MEDICINE

## 2018-11-29 RX ORDER — RANITIDINE 150 MG/1
150 TABLET ORAL
COMMUNITY
Start: 2018-10-01 | End: 2018-12-03

## 2018-11-29 NOTE — PROGRESS NOTES
[address]   Cardiac Evaluation      Patient: Korey Coronel  YOB: 1950  Date: 11/29/18       Chief Complaint   Patient presents with    Coronary Artery Disease    Hypertension        Referring provider: Harjeet Davey MD    History of Present Illness:   Mr Ruiz is seen today for follow up. He was initially seen for re-evaluation of his coronary disease in 2012. At that time he had 4 stents placed at [facility] in 2005. He states he did not have an MI, did not have chest pain. His symptoms at that time were fatigue. He was transferred to [facility] after having an angiogram at [facility]. Dr Bridgett Anne performed his initial angiogram. Karan's other history includes hypertension and hyperlipidemia. Glen Lyonpavithra Segura states he has had several nuclear stress tests in Dr Emily Marcano office since his event in 2005. He also has yearly echo's and carotid dopplers through Dr Chrissy Abdi. He does not smoke or drink alcohol. June 29, 2015 Mr Ruiz was on vacation. He developed chest pain, . He was diagnosed with acute MI and an angiogram was performed with 3 stents placed to LCX and OM2.      angiogram 7/10/15 that revealed EF 40%, LAD - diffuse disease with long stented area in the prox and mid vessel with diffuse instent 40%, distal apical 90%, LCX - 40% ostial, 30% before LCX stent, 40% just prox to large PL branch, bifurcation stents in the LCX-OM3 junction which are patent. OM1 small with 90% ostial, RCA - dom, diffusely diseased, 50%prox, long stented area with localized 60% stenosis, 50% between stents, 2nd stent prox to PDA is patent, PDA - prox 50%. He then had a plain stress test for rehab and participated in rehab. January, 2018 he was admitted to [facility] after a grossly abnormal gxt. Cath performed then referred for CABG. Dr Jordana Morrison operated 1/29/18 w/ CABG x 4 LIMA-LAD, SV-PDA, SV-Diag-OM2. Today, Mr Ruiz states he has been fine other than knee pain.  He denies any chest pain, palpitations, RUIZ, dizziness, mentation, behavior. · Psychiatric: anxious   · Endocrine: No malaise or fever  · Hematologic/Lymphatic: No abnormal bruising or bleeding, blood clots or swollen lymph nodes. · Allergic/Immunologic: No nasal congestion or hives. Physical Examination:    Vitals:    11/29/18 1122   BP: 136/82   Site: Left Upper Arm   Position: Sitting   Cuff Size: Medium Adult   Pulse: 62   Weight: 204 lb (92.5 kg)   Height: 5' 8\" (1.727 m)     Body mass index is 31.02 kg/m². Wt Readings from Last 3 Encounters:   11/29/18 204 lb (92.5 kg)   09/12/18 204 lb (92.5 kg)   05/17/18 206 lb (93.4 kg)      BP Readings from Last 3 Encounters:   11/29/18 136/82   09/12/18 118/70   05/17/18 (!) 140/80      Constitutional and General Appearance:  appears stated age  Respiratory:  · Normal excursion and expansion without use of accessory muscles  · Resp Auscultation: Normal breath sounds without dullness  Cardiovascular:  · The apical impulses not displaced  · Heart is regular rate and rhythm with normal S1, S2, EKG today reviewed by me NSR  · The carotid upstroke is normal, no bruit noted   · JVP is not elevated  · Peripheral pulses are symmetrical  · There is no clubbing, cyanosis of the extremities  · No edema  · Femoral Arteries: 2+ and equal  · Pedal Pulses: 2+ and equal   Abdomen:  · No masses or tenderness  · Normal bowel sounds  Neurological/Psychiatric:  · Alert and oriented x3  · Moves all extremities well  · Exhibits normal gait balance and coordination      Assessment/Plan  1. Carotid stenosis: stable, doppler 7/12> <50% stenosis bilateral, repeat doppler 8/4/17: 0% bilateral   2. CAD (coronary artery disease): stable  CABG 1/29/18: CABG x 4 LIMA-LAD, SV-PDA, SV-Diag-OM2  Cath 1/24/18: LVEDP - 5.  LVgram - mild inferior hypokinesis, EF 50%  Cors: LM - nl, LAD - proximal stents (2) with instent stenosis up to 95%, 70% between dx2 and dx3  DX1 - totalled with faint filling, DX2 moderate in caliber, RI - small with 90% ostial, LCX - 40% ostial, 50% between om1 and om2, mid stent and stent in OM2, 40% after mid stent, OM3 70%   RCA - dominant with 50% prox, long stented mid vessel with instent stenoses of 50-80%  Ff by 90% with another stent at RCA/PDA with instent restenosis of 95% Faint r-l collateral to the area of dx1  GXT 1/24/18: medium sized inferolateral fixed defect of moderate intensity consistent w/ infarction in territory of LCx and/or RCA. Small to medium sized anterior completely reversible defect of moderate intensity consistent with ischemia in territory of LAD. Small to medium sized inferolateral completely reversible defect of moderate intensity consistent with ischemia in Lcx and/or RCA. EF 47%. Referred to angiogram  Echo 4/28/17: EF 55%. There is inferior hypokinesis. Mild cLVH. Diastolic filling parameters suggests grade I diastolic dysfunction. Mild mitral annular calcification is present. Aortic valve appears sclerotic but opens adequately. trivial mitral, pulmonic and tricuspid regurgitation with RVSP 25 mmHg. Cath 7/10/15: LVEDP - 16, EF 40%  Cors: LM - nl. LAD - diffuse disease with long stented area in the prox and mid vessel with diffuse instent 40%, distal apical 90%. LCX - 40% ostial, 30% before LCX stent, 40% just prox to large PL branch, bifurcation stents in the LCX-OM3 junction which are patent. OM1 small with 90% ostial  RCA - dom, diffusely diseased, 50%prox, long stented area with localized 60% stenosis, 50% between stents, 2nd stent prox to PDA is patent, PDA - prox 50%  FFR of RCA through all diseased segments was 0.85 ff 2 min of adenosine  Echo 6/2915 [facility]) [address], mildly reduced EF 50-55%, right ventricle mildly dilated, right atrium mildly dilated, left ventricular posterior hypokinesis  Cath 6/29/15 ([place]): right radial artery approach. Left main nl, LAD 30% proximal stent and apical 75%, Cx totally occluded.  OM3 50%, RCA 30% in stent and distal 70%, PDA small to moderate with 80% proximal. No LV gram. Aspiration of Cx and OM with 2.5 x12 BERNIE in LCx, 2.75 x14 proximal to first stent in Cx, plaque shift to proximal OM with 2.75 x22 BERNIE to OM  stents x 4 at [facility] in 2005 (do not have records currently)  12/28/12 nuclear stress test> normal perfusion/EF, B/P elevated at rest & during peak exercise   3. Hypertension:   Stable   4. Hyperlipidemia: 1/18: ; TRIG 98; HDL 28; LDL 77, on statin, had aches on 80 mg Pravastatin. changed back to Crestor 10 mg, which he states he is tolerating   5. PVC's -palpitations, holter monitor 9/18: sinus rhythm, PVC's, PAC's. MCOT 11/27/2015 SB, no PVCs    PLAN:  Check lipids and CMP. Encouraged to continue regular exercise. No med changes. FU 6 months. Scribe's attestation: This note was scribed in the presence of Dr Rhonda Patrick MD by Sabas Osullivan RN. The scribe's documentation has been prepared under my direction and personally reviewed by me in its entirety. I confirm that the note above accurately reflects all work, treatment, procedures, and medical decision making performed by me. Thank you for allowing to me to participate in the care of Satira Pallas.

## 2018-12-03 ENCOUNTER — OFFICE VISIT (OUTPATIENT)
Dept: PULMONOLOGY | Age: 68
End: 2018-12-03
Payer: MEDICARE

## 2018-12-03 VITALS
WEIGHT: 205 LBS | SYSTOLIC BLOOD PRESSURE: 128 MMHG | HEART RATE: 69 BPM | BODY MASS INDEX: 31.07 KG/M2 | DIASTOLIC BLOOD PRESSURE: 80 MMHG | OXYGEN SATURATION: 98 % | HEIGHT: 68 IN

## 2018-12-03 DIAGNOSIS — G47.33 OBSTRUCTIVE APNEA: Primary | Chronic | ICD-10-CM

## 2018-12-03 DIAGNOSIS — E66.2 CLASS 1 OBESITY WITH ALVEOLAR HYPOVENTILATION WITHOUT SERIOUS COMORBIDITY WITH BODY MASS INDEX (BMI) OF 31.0 TO 31.9 IN ADULT (HCC): ICD-10-CM

## 2018-12-03 DIAGNOSIS — I10 ESSENTIAL HYPERTENSION, BENIGN: ICD-10-CM

## 2018-12-03 DIAGNOSIS — I25.10 CORONARY ARTERY DISEASE INVOLVING NATIVE CORONARY ARTERY OF NATIVE HEART WITHOUT ANGINA PECTORIS: Chronic | ICD-10-CM

## 2018-12-03 PROBLEM — E66.811 CLASS 1 OBESITY WITH ALVEOLAR HYPOVENTILATION WITHOUT SERIOUS COMORBIDITY WITH BODY MASS INDEX (BMI) OF 31.0 TO 31.9 IN ADULT: Status: ACTIVE | Noted: 2018-12-03

## 2018-12-03 PROCEDURE — 1123F ACP DISCUSS/DSCN MKR DOCD: CPT | Performed by: NURSE PRACTITIONER

## 2018-12-03 PROCEDURE — G8427 DOCREV CUR MEDS BY ELIG CLIN: HCPCS | Performed by: NURSE PRACTITIONER

## 2018-12-03 PROCEDURE — G8598 ASA/ANTIPLAT THER USED: HCPCS | Performed by: NURSE PRACTITIONER

## 2018-12-03 PROCEDURE — 4040F PNEUMOC VAC/ADMIN/RCVD: CPT | Performed by: NURSE PRACTITIONER

## 2018-12-03 PROCEDURE — 1101F PT FALLS ASSESS-DOCD LE1/YR: CPT | Performed by: NURSE PRACTITIONER

## 2018-12-03 PROCEDURE — 3017F COLORECTAL CA SCREEN DOC REV: CPT | Performed by: NURSE PRACTITIONER

## 2018-12-03 PROCEDURE — 1036F TOBACCO NON-USER: CPT | Performed by: NURSE PRACTITIONER

## 2018-12-03 PROCEDURE — G8417 CALC BMI ABV UP PARAM F/U: HCPCS | Performed by: NURSE PRACTITIONER

## 2018-12-03 PROCEDURE — 99214 OFFICE O/P EST MOD 30 MIN: CPT | Performed by: NURSE PRACTITIONER

## 2018-12-03 PROCEDURE — G8482 FLU IMMUNIZE ORDER/ADMIN: HCPCS | Performed by: NURSE PRACTITIONER

## 2018-12-03 ASSESSMENT — SLEEP AND FATIGUE QUESTIONNAIRES
HOW LIKELY ARE YOU TO NOD OFF OR FALL ASLEEP WHILE SITTING QUIETLY AFTER LUNCH WITHOUT ALCOHOL: 0
HOW LIKELY ARE YOU TO NOD OFF OR FALL ASLEEP WHILE SITTING INACTIVE IN A PUBLIC PLACE: 2
HOW LIKELY ARE YOU TO NOD OFF OR FALL ASLEEP IN A CAR, WHILE STOPPED FOR A FEW MINUTES IN TRAFFIC: 0
HOW LIKELY ARE YOU TO NOD OFF OR FALL ASLEEP WHILE LYING DOWN TO REST IN THE AFTERNOON WHEN CIRCUMSTANCES PERMIT: 1
HOW LIKELY ARE YOU TO NOD OFF OR FALL ASLEEP WHILE SITTING AND READING: 2
HOW LIKELY ARE YOU TO NOD OFF OR FALL ASLEEP WHILE WATCHING TV: 1
HOW LIKELY ARE YOU TO NOD OFF OR FALL ASLEEP WHILE SITTING AND TALKING TO SOMEONE: 0
HOW LIKELY ARE YOU TO NOD OFF OR FALL ASLEEP WHEN YOU ARE A PASSENGER IN A CAR FOR AN HOUR WITHOUT A BREAK: 3
ESS TOTAL SCORE: 9

## 2018-12-03 ASSESSMENT — ENCOUNTER SYMPTOMS
SINUS PRESSURE: 0
SHORTNESS OF BREATH: 0
ABDOMINAL PAIN: 0
ABDOMINAL DISTENTION: 0
RHINORRHEA: 0
EYE REDNESS: 0
EYE PAIN: 0
COUGH: 0
APNEA: 0

## 2018-12-03 NOTE — PROGRESS NOTES
Mj Calvillo MD, FAASM, [facility]  Danis Armenta, MSN, RN, CNP     [address] [place] [address] SLEEP MEDICINE  [address]  [facility] [address]  Dept: [phone]  Dept Fax: : [phone] [facility]  [address] [facility] [address] [phone]    Subjective:     Patient ID: Sameer Herzog is a 76 y.o. male. Chief Complaint   Patient presents with    Sleep Apnea       HPI:      Machine Present today:  Yes    Machine Modem/Download Info:  Compliance (hours/night): 6 hrs/night  Download AHI (/hour): 10.8 /HR     Average IPAP Pressure: 9.5 cmH2O  Average EPAP Pressure: 6.9 cmH2O         AUTO BIPAP - Settings (Raquel)  IPAP Max: 25 cmH2O  EPAP Min: 5 cmH2O  Pressure Support Min: 1  Pressure Support Max: 5             Comfort Settings  Humidity Level (0-8): 0  Flex/EPR (0-3): 3       [place] - Total score: 9    Follow-up :     Last Visit : November 2017      Patient reports the listed chronic Co-morbidities: HTN, CAD, obesity    are well controlled and stable at this time. Subjective Health Changes: Had a heart procedure earlier in the year     Over Night Oximetry: [] Yes  [] No  [x] NA   Using O2: [] Yes  [] No  [x] NA   Patient is compliant with the machine  [x] Yes  [] No   Feeling rested when using the machine   [x] Yes  [] No     Pressure is comfortable with inspiration and expiration  [x] Yes  [] No     Noticed changes in pressure   [x] Yes  [] No  [] NA   Mask is fitting well  [x] Yes  [] No   Noting Mask Air Leak  [] Yes  [x] No   Having painful Aerophagia  [] Yes  [x] No   Nocturia   0-1  per night.    Having  HA upon waking  [] Yes  [x] No   Dry mouth upon waking   Dry Nose  Dry Eyes  [x] Yes  [] No   Congestion upon waking   [] Yes  [x] No    Nose Bleeds  [] Yes  [x] No   Using Sleep Aides    [x] NA   Understands how to change humidification and/or tubing temperature for

## 2019-01-23 RX ORDER — CLOPIDOGREL BISULFATE 75 MG/1
TABLET ORAL
Qty: 90 TABLET | Refills: 3 | Status: SHIPPED | OUTPATIENT
Start: 2019-01-23 | End: 2020-02-14

## 2019-04-08 ENCOUNTER — OFFICE VISIT (OUTPATIENT)
Dept: ORTHOPEDIC SURGERY | Age: 69
End: 2019-04-08
Payer: MEDICARE

## 2019-04-08 VITALS — BODY MASS INDEX: 31.07 KG/M2 | HEIGHT: 68 IN | WEIGHT: 205.03 LBS

## 2019-04-08 DIAGNOSIS — M48.02 CERVICAL SPINAL STENOSIS: Primary | ICD-10-CM

## 2019-04-08 DIAGNOSIS — R29.898 WEAKNESS OF LEFT LOWER EXTREMITY: ICD-10-CM

## 2019-04-08 DIAGNOSIS — M10.9 GOUT, UNSPECIFIED CAUSE, UNSPECIFIED CHRONICITY, UNSPECIFIED SITE: ICD-10-CM

## 2019-04-08 DIAGNOSIS — M54.50 LUMBAR PAIN: ICD-10-CM

## 2019-04-08 DIAGNOSIS — M48.062 SPINAL STENOSIS OF LUMBAR REGION WITH NEUROGENIC CLAUDICATION: ICD-10-CM

## 2019-04-08 PROCEDURE — 99203 OFFICE O/P NEW LOW 30 MIN: CPT | Performed by: PHYSICAL MEDICINE & REHABILITATION

## 2019-04-08 PROCEDURE — G8417 CALC BMI ABV UP PARAM F/U: HCPCS | Performed by: PHYSICAL MEDICINE & REHABILITATION

## 2019-04-08 PROCEDURE — 3017F COLORECTAL CA SCREEN DOC REV: CPT | Performed by: PHYSICAL MEDICINE & REHABILITATION

## 2019-04-08 PROCEDURE — G8427 DOCREV CUR MEDS BY ELIG CLIN: HCPCS | Performed by: PHYSICAL MEDICINE & REHABILITATION

## 2019-04-08 RX ORDER — METHYLPREDNISOLONE 4 MG/1
TABLET ORAL
Qty: 1 KIT | Refills: 0 | Status: SHIPPED | OUTPATIENT
Start: 2019-04-08 | End: 2019-06-27 | Stop reason: ALTCHOICE

## 2019-04-08 NOTE — PROGRESS NOTES
New Patient: SPINE    Referring Provider:  Max Castillo MD    CHIEF COMPLAINT:    Chief Complaint   Patient presents with    Back Problem     NP LSP bilateral leg pain. referred by Dr. Torsten Tamez. HISTORY OF PRESENT ILLNESS:      · The patient is being sent at the request of Max Castillo MD in consultation as a new spine patient for low back pain and bilateral leg pain. The patient is a 76 y.o. male whom reports he reports she's had worsening low back pain radiating to both legs. He feels this is worse on the left side. He cannot identify any inciting event or any injury. He is seeing Dr. Kelin Mauricio for her knee pain and is considering a double knee replacement. Reports that his back pain is limiting his overall functioning. His pain is a 6 out of 10 persistent constant aching. This is worse with daily activities including standing and walking. No relieving measures have been found.      Pain Assessment  Location of Pain: Back  Quality of Pain: Aching(stabbing, sharp, spasms)  Duration of Pain: Persistent  Frequency of Pain: Constant  Aggravating Factors: (activity)  Limiting Behavior: Yes  Relieving Factors: Rest  Result of Injury: No  Work-Related Injury: No  Are there other pain locations you wish to document?: No      Associated signs and symptoms:   Neurogenic bowel or bladder symptoms:  no   Perceived weakness:  yes   Difficulty walking:  yes    Recent Imaging (within past one year)   Xrays: no   MRI or CT of spine: no    Current/Past Treatment:   · Physical Therapy:  yes  · Chiropractic:  none  · Injection:  none  · Medications:   NSAIDS:  yes   Muscle relaxer:  none   Steriods:  none   Neuropathic medications:  none   Opioids:  none  · Previous surgery:  no  · Previous surgical consult:  no                Past Medical History:   Past Medical History:   Diagnosis Date    CAD (coronary artery disease)     Hyperlipidemia     Hypertension     Obstructive apnea     Swallowing difficulty pt to f/u with an ENT    Thyroid disease       Past Surgical History:     Past Surgical History:   Procedure Laterality Date    CARDIAC CATHETERIZATION  01/24/2018    Dr. Tamica Aviles - [facility], cor angio    COLONOSCOPY  2011    CORONARY ANGIOPLASTY WITH STENT PLACEMENT  2005, 2015    4 placed at [address] [place] [address] in 2015 (Dr. Sergio Sullivan)   [address] Right 2001, 2004    KNEE ARTHROSCOPY Left 2000    for meniscus tear    PLANTAR FASCIA SURGERY Left 01/15/2013    Dr. Patricia Nicholson, DPM - endoscopic fasciotomy     Current Medications:     Current Outpatient Medications:     methylPREDNISolone (MEDROL, MARCO A,) 4 MG tablet, Take by mouth., Disp: 1 kit, Rfl: 0    clopidogrel (PLAVIX) 75 MG tablet, TAKE 1 TABLET BY MOUTH EVERY DAY, Disp: 90 tablet, Rfl: 3    rosuvastatin (CRESTOR) 10 MG tablet, Take 1 tablet by mouth daily, Disp: 90 tablet, Rfl: 3    carvedilol (COREG) 3.125 MG tablet, TAKE 1 TABLET BY MOUTH TWICE DAILY WITH MEALS, Disp: 180 tablet, Rfl: 3    losartan (COZAAR) 25 MG tablet, TAKE 1 TABLET BY MOUTH EVERY DAY, Disp: 90 tablet, Rfl: 3    levothyroxine (SYNTHROID) 137 MCG tablet, 175 mcg , Disp: , Rfl:     aspirin 81 MG chewable tablet, Take 81 mg by mouth daily, Disp: , Rfl:   Allergies:  Statins and Niacin and related  Social History:    reports that he has never smoked. He has never used smokeless tobacco. He reports that he does not drink alcohol. Family History:   Family History   Problem Relation Age of Onset    Heart Failure Mother     Liver Disease Mother     Alzheimer's Disease Mother          REVIEW OF SYSTEMS: Full ROS reviewed & scanned from 4/8/2019           PHYSICAL EXAM:    Vitals: Height 5' 7.99\" (1.727 m), weight 205 lb 0.4 oz (93 kg). GENERAL EXAM:  · General Apparence: Patient is adequately groomed with no evidence of malnutrition. · Psychiatric: Orientation: The patient is oriented to time, place and person.  The patient's mood and affect are appropriate · Vascular: Examination reveals no swelling and palpation reveals no tenderness in upper or lower extremities. Good capillary refill. · The lymphatic examination of the neck, axillae and groin reveals all areas to be without enlargement or induration   Sensation is intact without deficit in the upper and lower extremities to light touch and pinprick  · Coordination of the upper and lower extremities are normal.    CERVICAL EXAMINATION:  · Inspection: Local inspection shows no step-off or bruising. Cervical alignment is normal. No instability is noted. · Palpation and Percussion: No evidence of tenderness at the midline. Paraspinal tenderness is not present. There is no paraspinal spasm. · Range of Motion:  limited by 25% in all planes due to pain   · Strength: 5/5 bilateral upper extremities  · Special Tests:   Spurling's and Talavera's are + bilaterally   Darden and Impingement tests are negative bilaterally. · Skin:There are no rashes, ulcerations or lesions. · Reflexes: Bilaterally triceps, biceps and brachioradialis are 2+. Clonus absent bilaterally at the feet. No pathological reflexes are noted. · Gait & station: normal, patient ambulates without assistance  · Additional Examinations:  · RIGHT UPPER EXTREMITY:  Inspection/examination of the right upper extremity does not show any tenderness, deformity or injury. Range of motion is normal and pain-free. There is no gross instability. There are no rashes, ulcerations or lesions. Strength and tone are normal. No atrophy or abnormal movements are noted. · LEFT UPPER EXTREMITY: Inspection/examination of the left upper extremity does not show any tenderness, deformity or injury. Range of motion is normal and pain-free. There is no gross instability. There are no rashes, ulcerations or lesions. Strength and tone are normal. No atrophy or abnormal movements are noted.     LUMBAR/SACRAL EXAMINATION:  · Inspection: Local inspection shows no step-off or METABOLIC PANEL   Result Value Ref Range    Sodium 142 136 - 145 mmol/L    Potassium 4.8 3.5 - 5.1 mmol/L    Chloride 105 99 - 110 mmol/L    CO2 23 21 - 32 mmol/L    Anion Gap 14 3 - 16    Glucose 115 (H) 70 - 99 mg/dL    BUN 19 7 - 20 mg/dL    CREATININE 1.4 (H) 0.8 - 1.3 mg/dL    GFR Non-African American 50 (A) >60    GFR African American >60 >60    Calcium 9.4 8.3 - 10.6 mg/dL   MAGNESIUM   Result Value Ref Range    Magnesium 2.30 1.80 - 2.40 mg/dL       Impression (Medical Decision Making):       1. Cervical spinal stenosis    2. Spinal stenosis of lumbar region with neurogenic claudication    3. Gout, unspecified cause, unspecified chronicity, unspecified site    4. Weakness of left lower extremity    5. Lumbar pain        Plan (Medical Decision Making):    I discussed the diagnosis and the treatment options with Carmina Zepeda today. In Summary:  The various treatment options were outlined and discussed with Carmina Zepeda including:  Conservative care options: physical therapy, ice, medications, bracing, and activity modification. The indications for therapeutic injections. The indications for additional imaging/laboratory studies. The indications for (possible future) interventions. After considering the various options discussed, Carmina Zepeda elected to pursue a course of treatment that includes the followin. Medications: I will prescribe a medrol dose pack to help with the inflammatory component of pain. Risks, benefits and alternatives were discussed. Recommended to stop any NSAIDs to reduce risk of GI bleed during the course of the dose pack. 2. PT:  Encouraged to continue with HEP. 3. Further studies:  MR Lumbar spine    4. Interventional:  After further imaging is obtained, interventional options will be reviewed and recommended.     5. Healthy Lifestyle Measures:  Patient education material reviewing the following was distributed to [employer] [employer]/O lifestyle education  Osteoporosis prevention,   Back and neck pain educational information   Advanced imaging preparedness    Posture education   Proper lifting and carrying techniques,   Weight management  Quitting smoking and   Minor ways to treat back pain  For further information regarding the spine conditions and to review interventional treatments the patient was directed to [facility].    6.  Follow up:  1-2 weeks    Hanna Mejias was instructed to call the office if his symptoms worsen or if new symptoms appear prior to the next scheduled visit. He is specifically instructed to contact the office between now & his scheduled appointment if he has concerns related to his condition or if he needs assistance in scheduling the above tests. He is welcome to call for an appointment sooner if he has any additional concerns or questions. Renetta Smyth. Paulina Rockwell MD, MARIA ISABEL, [facility]  Board Certified in [address] [place] [address] Certified and Fellowship Trained in [facility] ([facility])     This dictation was performed with a verbal recognition program [facility]) and it was checked for errors. It is possible that there are still dictated errors within this office note. If so, please bring any errors to my attention for an addendum. All efforts were made to ensure that this office note is accurate.

## 2019-04-17 ENCOUNTER — TELEPHONE (OUTPATIENT)
Dept: ORTHOPEDIC SURGERY | Age: 69
End: 2019-04-17

## 2019-04-17 NOTE — TELEPHONE ENCOUNTER
PATIENT HASN'T HEARD BACK YET AS TO WHETHER OR NOT HIS MRI HAS BEEN APPROVED. HE IS SCHEDULED FOR FU ON 4/22/19 BUT HASN'T EVEN HAD MRI SCHEDULED YET. PLEASE CALL.

## 2019-04-18 ENCOUNTER — HOSPITAL ENCOUNTER (OUTPATIENT)
Dept: MRI IMAGING | Age: 69
Discharge: HOME | End: 2019-04-18
Payer: MEDICARE

## 2019-04-18 DIAGNOSIS — M48.062 SPINAL STENOSIS OF LUMBAR REGION WITH NEUROGENIC CLAUDICATION: ICD-10-CM

## 2019-04-18 DIAGNOSIS — M48.02 CERVICAL SPINAL STENOSIS: ICD-10-CM

## 2019-04-18 DIAGNOSIS — R29.898 WEAKNESS OF LEFT LOWER EXTREMITY: ICD-10-CM

## 2019-04-18 DIAGNOSIS — M10.9 GOUT, UNSPECIFIED CAUSE, UNSPECIFIED CHRONICITY, UNSPECIFIED SITE: ICD-10-CM

## 2019-04-18 PROCEDURE — 72148 MRI LUMBAR SPINE W/O DYE: CPT

## 2019-04-18 PROCEDURE — 72141 MRI NECK SPINE W/O DYE: CPT

## 2019-04-22 ENCOUNTER — OFFICE VISIT (OUTPATIENT)
Dept: ORTHOPEDIC SURGERY | Age: 69
End: 2019-04-22
Payer: MEDICARE

## 2019-04-22 VITALS
HEIGHT: 68 IN | DIASTOLIC BLOOD PRESSURE: 82 MMHG | SYSTOLIC BLOOD PRESSURE: 158 MMHG | WEIGHT: 211.64 LBS | BODY MASS INDEX: 32.08 KG/M2

## 2019-04-22 DIAGNOSIS — M51.36 DDD (DEGENERATIVE DISC DISEASE), LUMBAR: ICD-10-CM

## 2019-04-22 DIAGNOSIS — M48.062 LUMBAR STENOSIS WITH NEUROGENIC CLAUDICATION: ICD-10-CM

## 2019-04-22 DIAGNOSIS — I73.9 VASCULAR CLAUDICATION (HCC): Primary | ICD-10-CM

## 2019-04-22 DIAGNOSIS — M47.816 SPONDYLOSIS OF LUMBAR REGION WITHOUT MYELOPATHY OR RADICULOPATHY: ICD-10-CM

## 2019-04-22 PROCEDURE — 1036F TOBACCO NON-USER: CPT | Performed by: PHYSICAL MEDICINE & REHABILITATION

## 2019-04-22 PROCEDURE — 1123F ACP DISCUSS/DSCN MKR DOCD: CPT | Performed by: PHYSICAL MEDICINE & REHABILITATION

## 2019-04-22 PROCEDURE — G8417 CALC BMI ABV UP PARAM F/U: HCPCS | Performed by: PHYSICAL MEDICINE & REHABILITATION

## 2019-04-22 PROCEDURE — 3017F COLORECTAL CA SCREEN DOC REV: CPT | Performed by: PHYSICAL MEDICINE & REHABILITATION

## 2019-04-22 PROCEDURE — G8427 DOCREV CUR MEDS BY ELIG CLIN: HCPCS | Performed by: PHYSICAL MEDICINE & REHABILITATION

## 2019-04-22 PROCEDURE — 4040F PNEUMOC VAC/ADMIN/RCVD: CPT | Performed by: PHYSICAL MEDICINE & REHABILITATION

## 2019-04-22 PROCEDURE — 99214 OFFICE O/P EST MOD 30 MIN: CPT | Performed by: PHYSICAL MEDICINE & REHABILITATION

## 2019-04-22 PROCEDURE — G8598 ASA/ANTIPLAT THER USED: HCPCS | Performed by: PHYSICAL MEDICINE & REHABILITATION

## 2019-04-22 NOTE — PROGRESS NOTES
Medications:     Current Outpatient Medications:     methylPREDNISolone (MEDROL, MARCO A,) 4 MG tablet, Take by mouth., Disp: 1 kit, Rfl: 0    clopidogrel (PLAVIX) 75 MG tablet, TAKE 1 TABLET BY MOUTH EVERY DAY, Disp: 90 tablet, Rfl: 3    rosuvastatin (CRESTOR) 10 MG tablet, Take 1 tablet by mouth daily, Disp: 90 tablet, Rfl: 3    carvedilol (COREG) 3.125 MG tablet, TAKE 1 TABLET BY MOUTH TWICE DAILY WITH MEALS, Disp: 180 tablet, Rfl: 3    losartan (COZAAR) 25 MG tablet, TAKE 1 TABLET BY MOUTH EVERY DAY, Disp: 90 tablet, Rfl: 3    levothyroxine (SYNTHROID) 137 MCG tablet, 175 mcg , Disp: , Rfl:     aspirin 81 MG chewable tablet, Take 81 mg by mouth daily, Disp: , Rfl:   Allergies:  Statins and Niacin and related  Social History:    reports that he has never smoked. He has never used smokeless tobacco. He reports that he does not drink alcohol. Family History:   Family History   Problem Relation Age of Onset    Heart Failure Mother     Liver Disease Mother     Alzheimer's Disease Mother        REVIEW OF SYSTEMS:   CONSTITUTIONAL: Denies unexplained weight loss, fevers, chills or fatigue  NEUROLOGICAL: Denies unsteady gait or progressive weakness  MUSCULOSKELETAL: Denies joint swelling or redness  GI: Denies nausea, vomiting, diarrhea   : Denies bowel or bladder issues       PHYSICAL EXAM:    Vitals: Blood pressure (!) 158/82, height 5' 7.99\" (1.727 m), weight 211 lb 10.3 oz (96 kg). GENERAL EXAM:  · General Apparence: Patient is adequately groomed with no evidence of malnutrition. · Psychiatric: Orientation: The patient is oriented to time, place and person. The patient's mood and affect are appropriate   · Vascular: Examination reveals no swelling and palpation reveals no tenderness in upper or lower extremities. Good capillary refill.    · The lymphatic examination of the neck, axillae and groin reveals all areas to be without enlargement or induration  · Sensation is intact without deficit in the upper and lower extremities to light touch and pinprick  · Coordination of the upper and lower extremities are normal.    CERVICAL EXAMINATION:  · Inspection: Local inspection shows no step-off or bruising. Cervical alignment is normal. No instability is noted. · Palpation and Percussion: No evidence of tenderness at the midline. Paraspinal tenderness is not present. There is no paraspinal spasm. Skin:There are no rashes, ulcerations or lesions  · Range of Motion:  limited by 25% in all planes due to pain   · Strength: 5/5 bilateral upper extremities  · Special Tests:   Spurling's and Talavera's are negative bilaterally. · Skin:There are no rashes, ulcerations or lesions in right & left upper extremities. · Reflexes: Bilaterally triceps, biceps and brachioradialis are 2+. Clonus absent bilaterally at the feet. No pathological reflexes are noted. · Gait & station: normal, patient ambulates without assistance  · Additional Examinations:  · RIGHT UPPER EXTREMITY:  Inspection/examination of the right upper extremity does not show any tenderness, deformity or injury. Range of motion is unremarkable and pain-free. There is no gross instability. There are no rashes, ulcerations or lesions. Strength and tone are normal. No atrophy or abnormal movements are noted. · LEFT UPPER EXTREMITY: Inspection/examination of the left upper extremity does not show any tenderness, deformity or injury. Range of motion is unremarkable and pain-free. There is no gross instability. There are no rashes, ulcerations or lesions. Strength and tone are normal. No atrophy or abnormal movements are noted. LUMBAR/SACRAL EXAMINATION:  · Inspection: Local inspection shows no step-off or bruising. Lumbar alignment is normal. No instability is noted. · Palpation:   No evidence of tenderness at the midline.   Lumbar paraspinal tenderness: Mild L4/5 and L5/S1 tenderness  Bursal tenderness No tenderness bilaterally  There is no paraspinal spasm.  · Range of Motion: limited by 25% in all planes due to pain  · Strength:   Strength testing is 5/5 in all muscle groups tested. · Special Tests:   Straight leg raise and crossed SLR negative. Ranulfo's testing is negative bilaterally. FADIR's testing is negative bilaterally. · Skin: There are no rashes, ulcerations or lesions. · Reflexes: Reflexes are symmetrically 2+ at the patellar and ankle tendons. Clonus absent bilaterally at the feet. · Gait & station: normal, patient ambulates without assistance  · Additional Examinations:  · RIGHT LOWER EXTREMITY: Inspection/examination of the right lower extremity does not show any tenderness, deformity or injury. Range of motion is normal and pain-free. There is no gross instability. There are no rashes, ulcerations or lesions. Strength and tone are normal. No atrophy or abnormal movements are noted. · LEFT LOWER EXTREMITY:  Inspection/examination of the left lower extremity does not show any tenderness, deformity or injury. Range of motion is normal and pain-free. There is no gross instability. There are no rashes, ulcerations or lesions. Strength and tone are normal. No atrophy or abnormal movements are noted. Diagnostic Testing:    MRI cervical spine:       No acute abnormality in the cervical spine.       Degenerative disc disease as described above.       No spinal canal narrowing.       Foraminal narrowing, mild at right C5-6.       MRI lumbar spine:       No acute abnormality in the lumbar spine.       Degenerative disc disease as described above, exacerbating congenitally   narrow lumbar spinal canal.       Spinal canal narrowing, minimal at L3-4.       Foraminal narrowing, mild at right L4-5, minimal at bilateral L3-4 and left   L4-5.        Results for orders placed or performed during the hospital encounter of 02/09/18   CBC   Result Value Ref Range    WBC 8.0 4.0 - 11.0 K/uL    RBC 3.79 (L) 4.20 - 5.90 M/uL    Hemoglobin 11.7 (L) 13.5 - 17.5 next scheduled visit. He is specifically instructed to contact the office between now & his scheduled appointment if he has concerns related to his condition or if he needs assistance in scheduling the above tests. He is welcome to call for an appointment sooner if he has any additional concerns or questions. Galen Fischer. Mee Dorman MD, MARIA ISABEL, [facility]  Board Certified in [address] [place] [address] Certified and Fellowship Trained in [facility] ([facility])             This dictation was performed with a verbal recognition program [facility]) and it was checked for errors. It is possible that there are still dictated errors within this office note. If so, please bring any errors to my attention for an addendum. All efforts were made to ensure that this office note is accurate.

## 2019-04-25 DIAGNOSIS — M79.10 MUSCLE PAIN: Primary | ICD-10-CM

## 2019-05-03 ENCOUNTER — OFFICE VISIT (OUTPATIENT)
Dept: ORTHOPEDIC SURGERY | Age: 69
End: 2019-05-03
Payer: MEDICARE

## 2019-05-03 ENCOUNTER — TELEPHONE (OUTPATIENT)
Dept: CARDIOLOGY CLINIC | Age: 69
End: 2019-05-03

## 2019-05-03 ENCOUNTER — TELEPHONE (OUTPATIENT)
Dept: ORTHOPEDIC SURGERY | Age: 69
End: 2019-05-03

## 2019-05-03 VITALS
SYSTOLIC BLOOD PRESSURE: 126 MMHG | DIASTOLIC BLOOD PRESSURE: 84 MMHG | HEART RATE: 88 BPM | HEIGHT: 68 IN | BODY MASS INDEX: 32.08 KG/M2 | WEIGHT: 211.64 LBS

## 2019-05-03 DIAGNOSIS — I73.9 VASCULAR CLAUDICATION (HCC): ICD-10-CM

## 2019-05-03 DIAGNOSIS — M51.36 LUMBAR DEGENERATIVE DISC DISEASE: Primary | ICD-10-CM

## 2019-05-03 DIAGNOSIS — M54.16 LUMBAR RADICULOPATHY: ICD-10-CM

## 2019-05-03 PROCEDURE — G8427 DOCREV CUR MEDS BY ELIG CLIN: HCPCS | Performed by: PHYSICAL MEDICINE & REHABILITATION

## 2019-05-03 PROCEDURE — 3017F COLORECTAL CA SCREEN DOC REV: CPT | Performed by: PHYSICAL MEDICINE & REHABILITATION

## 2019-05-03 PROCEDURE — 1123F ACP DISCUSS/DSCN MKR DOCD: CPT | Performed by: PHYSICAL MEDICINE & REHABILITATION

## 2019-05-03 PROCEDURE — 1036F TOBACCO NON-USER: CPT | Performed by: PHYSICAL MEDICINE & REHABILITATION

## 2019-05-03 PROCEDURE — 4040F PNEUMOC VAC/ADMIN/RCVD: CPT | Performed by: PHYSICAL MEDICINE & REHABILITATION

## 2019-05-03 PROCEDURE — G8598 ASA/ANTIPLAT THER USED: HCPCS | Performed by: PHYSICAL MEDICINE & REHABILITATION

## 2019-05-03 PROCEDURE — G8417 CALC BMI ABV UP PARAM F/U: HCPCS | Performed by: PHYSICAL MEDICINE & REHABILITATION

## 2019-05-03 PROCEDURE — 99214 OFFICE O/P EST MOD 30 MIN: CPT | Performed by: PHYSICAL MEDICINE & REHABILITATION

## 2019-05-03 NOTE — PROGRESS NOTES
Follow up: Gm Doty  1950  [id number]      CHIEF COMPLAINT:    Chief Complaint   Patient presents with    Back Problem     fua TR EMG BLE. c/o bilateral leg/ankle swelling. HISTORY OF PRESENT ILLNESS:  Mr. Roseann Higgins is a 71 y.o. male returns for a follow up visit for multiple medical problems. His current presenting problems are   1. Lumbar degenerative disc disease    2. Lumbar radiculopathy    3. Vascular claudication ([address]    . As per information/history obtained from the PADT(patient assessment and documentation tool) - He complains of pain in the lower back with radiation to the lower leg Bilateral He rates the pain 10/10 and describes it as aching, throbbing. Pain is made worse by: walking, standing. He denies side effects from the current pain regimen. Patient reports that since the last follow up visit the physical functioning is unchanged, family/social relationships are unchanged, mood is unchanged and sleep patterns are unchanged, and that the overall functioning is unchanged. Patient denies neurological bowel or bladder. Patient returns today for review of his bilateral lower extremity EMG testing. He and his wife state that over the last 2 months his legs, particularly his ankles, have begun to swell. This tends to be worse at the end of the day. The patient continues to be frustrated by his pain levels and lack of relief. He denies any new injuries or triggers since his last visit. He is not ambulating with any assistive devices in the office today.      Associated signs and symptoms:   Neurogenic bowel or bladder symptoms:  no   Perceived weakness:  yes   Difficulty walking:  yes              Past Medical History:   Past Medical History:   Diagnosis Date    CAD (coronary artery disease)     Hyperlipidemia     Hypertension     Obstructive apnea     Swallowing difficulty     pt to f/u with an ENT    Thyroid disease       Past Surgical History:     Past Surgical with no evidence of malnutrition. · Psychiatric: Orientation: The patient is oriented to time, place and person. The patient's mood and affect are appropriate   · Vascular: Examination reveals swelling in both feet and palpation reveals no tenderness in upper or lower extremities. Good capillary refill. · The lymphatic examination of the neck, axillae and groin reveals all areas to be without enlargement or induration  · Sensation is intact without deficit in the upper and lower extremities to light touch and pinprick  · Coordination of the upper and lower extremities are normal.    CERVICAL EXAMINATION:  · Inspection: Local inspection shows no step-off or bruising. Cervical alignment is normal. No instability is noted. · Palpation and Percussion: No evidence of tenderness at the midline. Paraspinal tenderness is not present. There is no paraspinal spasm. Skin:There are no rashes, ulcerations or lesions  · Range of Motion:  pain-free ROM   · Strength: 5/5 bilateral upper extremities  · Special Tests:   Spurling's and Talavera's are negative bilaterally. Darden and Impingement tests are negative bilaterally. · Skin:There are no rashes, ulcerations or lesions in right & left upper extremities. · Reflexes: Bilaterally triceps, biceps and brachioradialis are 2+. Clonus absent bilaterally at the feet. No pathological reflexes are noted. · Gait & station: normal, patient ambulates without assistance  · Additional Examinations:  · RIGHT UPPER EXTREMITY:  Inspection/examination of the right upper extremity does not show any tenderness, deformity or injury. Range of motion is unremarkable and pain-free. There is no gross instability. There are no rashes, ulcerations or lesions. Strength and tone are normal. No atrophy or abnormal movements are noted. · LEFT UPPER EXTREMITY: Inspection/examination of the left upper extremity does not show any tenderness, deformity or injury.  Range of motion is unremarkable and pain-free. There is no gross instability. There are no rashes, ulcerations or lesions. Strength and tone are normal. No atrophy or abnormal movements are noted. LUMBAR/SACRAL EXAMINATION:  · Inspection: Local inspection shows no step-off or bruising. Lumbar alignment is normal. No instability is noted. · Palpation:   No evidence of tenderness at the midline. Lumbar paraspinal tenderness: Mild L4/5 and L5/S1 tenderness  Bursal tenderness No tenderness bilaterally  There is no paraspinal spasm. · Range of Motion: limited by 25% in all planes due to pain  · Strength:   Strength testing is 5/5 in all muscle groups tested. · Special Tests:   Straight leg raise and crossed SLR negative. · Skin: There are no rashes, ulcerations or lesions. · Reflexes: Reflexes are symmetrically 2+ at the patellar and ankle tendons. Clonus absent bilaterally at the feet. · Gait & station: normal, patient ambulates without assistance  · Additional Examinations:  · RIGHT LOWER EXTREMITY: Inspection/examination of the right lower extremity does not show any tenderness, deformity or injury. Range of motion is normal and pain-free. There is no gross instability. There are no rashes, ulcerations or lesions. Strength and tone are normal. No atrophy or abnormal movements are noted. · LEFT LOWER EXTREMITY:  Inspection/examination of the left lower extremity does not show any tenderness, deformity or injury. Range of motion is normal and pain-free. There is no gross instability. There are no rashes, ulcerations or lesions. Strength and tone are normal. No atrophy or abnormal movements are noted.       Diagnostic Testing:    EMG shows a left S1 Radiculopathy from 3/15/19  Results for orders placed or performed during the hospital encounter of 02/09/18   CBC   Result Value Ref Range    WBC 8.0 4.0 - 11.0 K/uL    RBC 3.79 (L) 4.20 - 5.90 M/uL    Hemoglobin 11.7 (L) 13.5 - 17.5 g/dL    Hematocrit 35.4 (L) 40.5 - 52.5 %    MCV 93.4 80.0 - 100.0 fL    MCH 30.8 26.0 - 34.0 pg    MCHC 33.0 31.0 - 36.0 g/dL    RDW 14.6 12.4 - 15.4 %    Platelets 540 447 - 230 K/uL    MPV 6.7 5.0 - 10.5 fL   BASIC METABOLIC PANEL   Result Value Ref Range    Sodium 142 136 - 145 mmol/L    Potassium 4.8 3.5 - 5.1 mmol/L    Chloride 105 99 - 110 mmol/L    CO2 23 21 - 32 mmol/L    Anion Gap 14 3 - 16    Glucose 115 (H) 70 - 99 mg/dL    BUN 19 7 - 20 mg/dL    CREATININE 1.4 (H) 0.8 - 1.3 mg/dL    GFR Non-African American 50 (A) >60    GFR African American >60 >60    Calcium 9.4 8.3 - 10.6 mg/dL   MAGNESIUM   Result Value Ref Range    Magnesium 2.30 1.80 - 2.40 mg/dL     Impression:       1. Lumbar degenerative disc disease    2. Lumbar radiculopathy    3. Vascular claudication [facility])        Plan:  Clinical Course: Above diagnoses are worsening    I discussed the diagnosis and the treatment options with [employer] today. In Summary:  The various treatment options were outlined and discussed with [employer] including:  Conservative care options: physical therapy, ice, medications, bracing, and activity modification. The indications for therapeutic injections. The indications for additional imaging/laboratory studies. The indications for (possible future) interventions. After considering the various options discussed, [employer] elected to pursue a course of treatment that includes the followin. Medications:  No further recommendations for new medications. 2. PT:  Encouraged to continue with HEP. 3. Further studies:  Referral to Vascular specialist    4. Interventional:  We discussed pursuing a Left L5 and S1 TF epidural steroid injection to address the pain. Radiologic imaging and symptoms confirm the pain etiology. Risks, benefits and alternatives of interventional options were discussed. These include and are not limited to bleeding, infection, spinal headache, nerve injury and lack of pain relief.   The patient verbalized understanding and would like to proceed. The patient will be scheduled accordingly. 5. Follow up:  2-3 weeks      Carline Melgar was instructed to call the office if his symptoms worsen or if new symptoms appear prior to the next scheduled visit. He is specifically instructed to contact the office between now & his scheduled appointment if he has concerns related to his condition or if he needs assistance in scheduling the above tests. He is welcome to call for an appointment sooner if he has any additional concerns or questions. Adeline Velásquez ATC, am scribing for and in the presence of Dr. Alis Berumen. 05/03/19 10:06 AM Trena Flores ATC    I, Dr. Mary Ann Bradford, personally performed the services described in this documentation as scribed by AGUEDA Garcia in my presence and it is both accurate and complete. Anu Abernathy. Ventura Hector MD, MARIA ISABEL, [facility]  Board Certified in [address] [place] [address] Certified and Fellowship Trained in [facility] ([facility])             This dictation was performed with a verbal recognition program [facility] ) and it was checked for errors. It is possible that there are still dictated errors within this office note. If so, please bring any errors to my attention for an addendum. All efforts were made to ensure that this office note is accurate.

## 2019-05-03 NOTE — TELEPHONE ENCOUNTER
L/m for approval to hold PLAVIX for 7 days prior to [facility] on 5/15/2019 BUT STAY ON ASA. Patient aware of hold date.

## 2019-05-03 NOTE — TELEPHONE ENCOUNTER
I spoke with an associate at [facility], to let them know [id number] will be out till Monday and will be addressed then. She verbalized understanding.

## 2019-05-03 NOTE — TELEPHONE ENCOUNTER
Pt is scheduled for Lower lumber epidural injection on 5-15-16. Pt needs to be off Plavix for 7 days prior but stay on his ASA. Pls call/fax to advise. Thank you.

## 2019-05-03 NOTE — LETTER
Please schedule the following with:     Date:   5/15/2019 @ 9:30     Account: [de-identified]  Patient: Shaw Clemente    : 1950  Address:  [address]  [place] [address]    Phone (H):  [phone] (home)      ----------------------------------------------------------------------------------------------  Diagnosis:     ICD-10-CM    1. Lumbar degenerative disc disease M51.36    2. Lumbar radiculopathy M54.16    3. Vascular claudication [facility]) I73.9 [facility] Vascular Surgery         Levels:L5, S1  Procedure type TRANSFORAMINAL  Side LEFT  CPT Codes [id number], 14937    ----------------------------------------------------------------------------------------------  Injection #   [id number]    Attending Physician       Mk Wise.  Sivan Espana MD.      ----------------------------------------------------------------------------------------------  Injection Scheduled For:    At:    1st [facility]    Pre-Cert#    2nd [address]    Pre-Cert#    Comments or Special instructions:    · Infection control  · Tested positive for MRSA in past 12 months:  no  · Tested positive for MSSA \"staph infection\" in past 12 months: no  · Tested positive for VRE (Vancomycin Resistant Enterococci) in past 12 months:   no  · Currently on any antibiotics for an infection: no  · Anticoagulants:  · On a blood thinner:  ASA DO NOT HOLD AND PLAVIX HOLD 7 DAYS DR Elke Pitts [phone]   · Any history of bleeding disorder: no   · Advanced Liver disease: no   · Advanced Renal disease: no   · Glaucoma: no   · Diabetes: no     Sedation:  No  -----------------------------------------------------------------------------------------------  Allergies   Allergen Reactions    Statins Other (See Comments)     Atorvastatin & pravastatin: myalgia    Niacin And Related

## 2019-05-06 NOTE — TELEPHONE ENCOUNTER
Spoke to Pt and gave instructions. He did stop the Crestor and there was no improvement. He is back on the Crestor. A letter will be faxed to Dr office.

## 2019-05-06 NOTE — TELEPHONE ENCOUNTER
It is ok to hold the plavix for 7 days. Tell him all his labs were normal. Ask if being off the statin helped. I doubt it would have. If not, he needs to restart the statin.

## 2019-05-07 NOTE — PROGRESS NOTES
PATIENT REACHED   YES____NO_X___    PREOP INSTUCTIONS LEFT ON VM MDEKOX__[phone]_____________      DATE__5/15/19_______ TIME__0930_______ARRIVAL_0830_______PLACE__masc__________  NOTHING TO EAT OR DRINK  6 HOURS PRIOR TO PROCEDURE START TIME  YOU NEED A RESPONSIBLE ADULT AGE 18 OR OLDER TO DRIVE YOU HOME  PLEASE BRING INSURANCE CARD. PICTURE ID AND COMPLETE LIST OF MEDS  WEAR LOOSE COMFORTABLE CLOTHING  FOLLOW ANY INSTRUCTIONS YOUR DRS OFFICE HAS GIVEN YOU,INCLUDING WHAT MEDICATIONS TO TAKE THE AM OF PROCEDURE AND WHEN AND IF YOU NEED TO STOP ANY BLOOD THINNERS. IF YOU HAVE QUESTIONS REGARDING THIS CALL THE OFFICE  THE GOAL BLOOD SUGAR THE AM OF PROCEDURE  OR LESS ABOVE THAT THE PROCEDURE MAY BE CANCELLED  ANY QUESTIONS CALL YOUR DOCTOR. ALSO,PLEASE READ THE INSTRUCTION PACKET FROM YOUR DR IF YOU RECEIVED ONE.   SPINE INTERVENTION NUMBER [id number]

## 2019-05-09 ENCOUNTER — TELEPHONE (OUTPATIENT)
Dept: ORTHOPEDIC SURGERY | Age: 69
End: 2019-05-09

## 2019-05-09 NOTE — TELEPHONE ENCOUNTER
DOS   05/15/2019  CPT   51744  47742  48715.26  [id number]  OP SX AUTH  NPR    LEFT  LEVELS   L5 - S1   PROCEDURE   TRANSFORAMINAL ARIA INJ    [address]:   MEDICARE

## 2019-05-13 ENCOUNTER — HOSPITAL ENCOUNTER (OUTPATIENT)
Dept: VASCULAR LAB | Age: 69
Discharge: HOME | End: 2019-05-13
Payer: MEDICARE

## 2019-05-13 DIAGNOSIS — I73.9 VASCULAR CLAUDICATION (HCC): ICD-10-CM

## 2019-05-13 PROCEDURE — 93925 LOWER EXTREMITY STUDY: CPT

## 2019-05-15 ENCOUNTER — HOSPITAL ENCOUNTER (OUTPATIENT)
Age: 69
Setting detail: OUTPATIENT SURGERY
Discharge: HOME | End: 2019-05-15
Attending: PHYSICAL MEDICINE & REHABILITATION | Admitting: PHYSICAL MEDICINE & REHABILITATION
Payer: MEDICARE

## 2019-05-15 ENCOUNTER — APPOINTMENT (OUTPATIENT)
Dept: GENERAL RADIOLOGY | Age: 69
End: 2019-05-15
Attending: PHYSICAL MEDICINE & REHABILITATION
Payer: MEDICARE

## 2019-05-15 VITALS
DIASTOLIC BLOOD PRESSURE: 88 MMHG | RESPIRATION RATE: 16 BRPM | OXYGEN SATURATION: 100 % | HEIGHT: 68 IN | TEMPERATURE: 97.1 F | SYSTOLIC BLOOD PRESSURE: 118 MMHG | BODY MASS INDEX: 32.18 KG/M2 | HEART RATE: 54 BPM

## 2019-05-15 PROCEDURE — 3209999900 FLUORO FOR SURGICAL PROCEDURES

## 2019-05-15 PROCEDURE — 6360000002 HC RX W HCPCS: Performed by: PHYSICAL MEDICINE & REHABILITATION

## 2019-05-15 PROCEDURE — 99152 MOD SED SAME PHYS/QHP 5/>YRS: CPT | Performed by: PHYSICAL MEDICINE & REHABILITATION

## 2019-05-15 PROCEDURE — 2500000003 HC RX 250 WO HCPCS: Performed by: PHYSICAL MEDICINE & REHABILITATION

## 2019-05-15 PROCEDURE — 2709999900 HC NON-CHARGEABLE SUPPLY: Performed by: PHYSICAL MEDICINE & REHABILITATION

## 2019-05-15 PROCEDURE — 3610000056 HC PAIN LEVEL 4 BASE (NON-OR): Performed by: PHYSICAL MEDICINE & REHABILITATION

## 2019-05-15 RX ORDER — LIDOCAINE HYDROCHLORIDE 10 MG/ML
INJECTION, SOLUTION INFILTRATION; PERINEURAL
Status: COMPLETED | OUTPATIENT
Start: 2019-05-15 | End: 2019-05-15

## 2019-05-15 RX ORDER — METHYLPREDNISOLONE ACETATE 80 MG/ML
INJECTION, SUSPENSION INTRA-ARTICULAR; INTRALESIONAL; INTRAMUSCULAR; SOFT TISSUE
Status: COMPLETED | OUTPATIENT
Start: 2019-05-15 | End: 2019-05-15

## 2019-05-15 RX ORDER — BUPIVACAINE HYDROCHLORIDE 5 MG/ML
INJECTION, SOLUTION PERINEURAL
Status: COMPLETED | OUTPATIENT
Start: 2019-05-15 | End: 2019-05-15

## 2019-05-15 RX ORDER — MIDAZOLAM HYDROCHLORIDE 1 MG/ML
INJECTION INTRAMUSCULAR; INTRAVENOUS
Status: COMPLETED | OUTPATIENT
Start: 2019-05-15 | End: 2019-05-15

## 2019-05-15 ASSESSMENT — PAIN SCALES - GENERAL
PAINLEVEL_OUTOF10: 0
PAINLEVEL_OUTOF10: 0

## 2019-05-15 ASSESSMENT — PAIN - FUNCTIONAL ASSESSMENT: PAIN_FUNCTIONAL_ASSESSMENT: 0-10

## 2019-05-15 NOTE — H&P
HISTORY AND PHYSICAL/PRE-SEDATION ASSESSMENT    Patient:  Parvez Eye   :  1950  Medical Record No.:  [id number]   Date:  5/15/2019  Physician:  Natalia Hutton M.D. Facility: [address] [place] [address]    HISTORY OF PRESENT ILLNESS:                 The patient is a 71 y.o. male whom presents with lower back and bilateral leg pain. Review of the imaging and physical exam of the patient confirmed the pre-procedure diagnosis. After a thorough discussion of risks, benefits and alternatives informed consent was obtained. Past Medical History:   Past Medical History:   Diagnosis Date    CAD (coronary artery disease)     Hyperlipidemia     Hypertension     Obstructive apnea     Swallowing difficulty     pt to f/u with an ENT    Thyroid disease       Past Surgical History:     Past Surgical History:   Procedure Laterality Date    CARDIAC CATHETERIZATION  2018    Dr. Kiana Huff - [facility], cor angio    COLONOSCOPY      CORONARY ANGIOPLASTY WITH STENT PLACEMENT  ,     4 placed at [address] [place] [address] in  (Dr. Charity Casanova)   [address] Right ,     KNEE ARTHROSCOPY Left     for meniscus tear    PLANTAR FASCIA SURGERY Left 01/15/2013    Dr. Jeanne Woodruff, DPM - endoscopic fasciotomy     Current Medications:   Prior to Admission medications    Medication Sig Start Date End Date Taking?  Authorizing Provider   rosuvastatin (CRESTOR) 10 MG tablet Take 1 tablet by mouth daily 18  Yes Wes Huggins MD   carvedilol (COREG) 3.125 MG tablet TAKE 1 TABLET BY MOUTH TWICE DAILY WITH MEALS 18  Yes Wes Huggins MD   losartan (COZAAR) 25 MG tablet TAKE 1 TABLET BY MOUTH EVERY DAY 18  Yes Wes Huggins MD   levothyroxine (SYNTHROID) 137 MCG tablet 175 mcg  7/14/15  Yes Historical Provider, MD   aspirin 81 MG chewable tablet Take 81 mg by mouth daily   Yes Historical Provider, MD   methylPREDNISolone (MEDROL, MARCO A,) 4 MG tablet Take by mouth. 4/8/19   Veronica Haley MD   clopidogrel (PLAVIX) 75 MG tablet TAKE 1 TABLET BY MOUTH EVERY DAY 1/23/19   Namrata Jiménez MD     Allergies:  Statins and Niacin and related  Social History:    reports that he has never smoked. He has never used smokeless tobacco. He reports that he does not drink alcohol. Family History:   Family History   Problem Relation Age of Onset    Heart Failure Mother     Liver Disease Mother     Alzheimer's Disease Mother        Vitals: Blood pressure 133/88, pulse 55, temperature 97.1 °F (36.2 °C), temperature source Temporal, resp. rate 20, height 5' 8\" (1.727 m), SpO2 99 %. PHYSICAL EXAM:including affected areas  HENT: Airway patent and reviewed  Cardiovascular: Normal rate, regular rhythm, normal heart sounds. Pulmonary/Chest: No wheezes. No rhonchi. No rales. Abdominal: Soft. Bowel sounds are normal. No distension. Extremities: Moves all extremities equally  Cervical and Lumbar Spine: Painful range of motion, no midline tenderness       Diagnosis:Lumbar radiculopathy  M51.36  M54.16  I73.9    Plan: Proceed with planned procedure      ASA CLASS:         []   I. Normal, healthy adult           [x]   II.  Mild systemic disease            []   III. Severe systemic disease      Mallampati: Mallampati Class II - (soft palate, fauces & uvula are visible)      Sedation plan:   [x]  Local              []  Minimal                  []  General anesthesia    Patient's condition acceptable for planned procedure/sedation. Post Procedure Plan   Return to same level of care   ______________________     The risks and benefits as well as alternatives to the procedure have been discussed with the patient and or family. The patient and or next of kin understands and agrees to proceed.     Veronica Haley M.D.

## 2019-05-16 ENCOUNTER — TELEPHONE (OUTPATIENT)
Dept: ORTHOPEDIC SURGERY | Age: 69
End: 2019-05-16

## 2019-05-16 NOTE — TELEPHONE ENCOUNTER
S/W PATIENT, lower extremity doppler results were normal. Patient voiced understanding of results. He will contact the office with further questions or concerns.

## 2019-05-20 ENCOUNTER — TELEPHONE (OUTPATIENT)
Dept: ORTHOPEDIC SURGERY | Age: 69
End: 2019-05-20

## 2019-05-20 NOTE — TELEPHONE ENCOUNTER
PT CALLED IN STATING HE HAD AN EPIDURAL ON 05/15 AND IT HAS NOT HELPED AS OF YET. HE WOULD LIKE TO KNOW IF IT WOULD BE OK TO GET A MASSAGE. PLEASE CALL PT [phone].

## 2019-05-20 NOTE — TELEPHONE ENCOUNTER
Yes this is okay for the patient to do. Let him know that sometimes it takes up to seven days to see increased relief of the ARIA.

## 2019-06-04 ENCOUNTER — OFFICE VISIT (OUTPATIENT)
Dept: ORTHOPEDIC SURGERY | Age: 69
End: 2019-06-04
Payer: MEDICARE

## 2019-06-04 VITALS — HEIGHT: 68 IN | BODY MASS INDEX: 32.08 KG/M2 | WEIGHT: 211.64 LBS

## 2019-06-04 DIAGNOSIS — M54.16 LUMBAR RADICULOPATHY: Primary | ICD-10-CM

## 2019-06-04 DIAGNOSIS — M48.061 LUMBAR FORAMINAL STENOSIS: ICD-10-CM

## 2019-06-04 PROCEDURE — 3017F COLORECTAL CA SCREEN DOC REV: CPT | Performed by: PHYSICAL MEDICINE & REHABILITATION

## 2019-06-04 PROCEDURE — 1123F ACP DISCUSS/DSCN MKR DOCD: CPT | Performed by: PHYSICAL MEDICINE & REHABILITATION

## 2019-06-04 PROCEDURE — G8427 DOCREV CUR MEDS BY ELIG CLIN: HCPCS | Performed by: PHYSICAL MEDICINE & REHABILITATION

## 2019-06-04 PROCEDURE — G8417 CALC BMI ABV UP PARAM F/U: HCPCS | Performed by: PHYSICAL MEDICINE & REHABILITATION

## 2019-06-04 PROCEDURE — 4040F PNEUMOC VAC/ADMIN/RCVD: CPT | Performed by: PHYSICAL MEDICINE & REHABILITATION

## 2019-06-04 PROCEDURE — 99213 OFFICE O/P EST LOW 20 MIN: CPT | Performed by: PHYSICAL MEDICINE & REHABILITATION

## 2019-06-04 PROCEDURE — G8598 ASA/ANTIPLAT THER USED: HCPCS | Performed by: PHYSICAL MEDICINE & REHABILITATION

## 2019-06-04 PROCEDURE — 1036F TOBACCO NON-USER: CPT | Performed by: PHYSICAL MEDICINE & REHABILITATION

## 2019-06-04 NOTE — PROGRESS NOTES
Follow up: Felicita Lange  1950  [id number]         Chief Complaint   Patient presents with    Back Pain     fua 5/15 TF L L5, L S1. symptoms improved. HISTORY OF PRESENT ILLNESS:  Mr. Rashad Vargas is a 71 y.o. male returns for a follow up visit for multiple medical problems. His current presenting problems are   1. Lumbar radiculopathy    2. Lumbar foraminal stenosis    . As per information/history obtained from the PADT(patient assessment and documentation tool) - He complains of pain in the lower back with radiation to the lower leg Bilateral He rates the pain 3/10 and describes it as piercing. Pain is made worse by: walking, standing. He denies side effects from the current pain regimen. Patient reports that since the last follow up visit the physical functioning is better, family/social relationships are better, mood is better and sleep patterns are better, and that the overall functioning is better. Patient denies neurological bowel or bladder. Patient underwent a transforaminal lumbar ARIA to left L5 and left S1 on 5/15/2019. He is unable to put a percentage to his improvement of symptoms. The burning sensation in his left calf has not dissipated. The other inflammation he felt has decreased. Patient reports that swelling in his legs has dissipated since the injection as well. He believes that the bulk of his pain is stemming from his knees. He was seen by Dr. Nitin Jett for the knee pain as well. Patient denies any new injuries or triggers at this time. He is not ambulating with any assistive devices at this time.      Associated signs and symptoms:   Neurogenic bowel or bladder symptoms:  no   Perceived weakness:  yes   Difficulty walking:  yes              Past Medical History:   Past Medical History:   Diagnosis Date    CAD (coronary artery disease)     Hyperlipidemia     Hypertension     Obstructive apnea     Swallowing difficulty     pt to f/u with an ENT    Thyroid disease Past Surgical History:     Past Surgical History:   Procedure Laterality Date    CARDIAC CATHETERIZATION  01/24/2018    Dr. Roslyn Concepcion - [facility], cor angio    COLONOSCOPY  2011    CORONARY ANGIOPLASTY WITH STENT PLACEMENT  2005, 2015    4 placed at [address] [place] [address] in 2015 (Dr. Adeola Barton)   [address] Right 2001, 2004    KNEE ARTHROSCOPY Left 2000    for meniscus tear    LUMBAR SPINE SURGERY Left 5/15/2019    LEFT L5, S1 TRANSFORAMINAL EPIDURAL STEROID INJECTION WITH FLUOROSCOPY performed by Sreedhar Motley MD at [facility] Left 01/15/2013    Dr. Rafael Squires, DPM - endoscopic fasciotomy     Current Medications:     Current Outpatient Medications:     methylPREDNISolone (MEDROL, MARCO A,) 4 MG tablet, Take by mouth., Disp: 1 kit, Rfl: 0    clopidogrel (PLAVIX) 75 MG tablet, TAKE 1 TABLET BY MOUTH EVERY DAY, Disp: 90 tablet, Rfl: 3    rosuvastatin (CRESTOR) 10 MG tablet, Take 1 tablet by mouth daily, Disp: 90 tablet, Rfl: 3    carvedilol (COREG) 3.125 MG tablet, TAKE 1 TABLET BY MOUTH TWICE DAILY WITH MEALS, Disp: 180 tablet, Rfl: 3    losartan (COZAAR) 25 MG tablet, TAKE 1 TABLET BY MOUTH EVERY DAY, Disp: 90 tablet, Rfl: 3    levothyroxine (SYNTHROID) 137 MCG tablet, 175 mcg , Disp: , Rfl:     aspirin 81 MG chewable tablet, Take 81 mg by mouth daily, Disp: , Rfl:   Allergies:  Statins and Niacin and related  Social History:    reports that he has never smoked. He has never used smokeless tobacco. He reports that he does not drink alcohol.   Family History:   Family History   Problem Relation Age of Onset    Heart Failure Mother     Liver Disease Mother     Alzheimer's Disease Mother        REVIEW OF SYSTEMS:   CONSTITUTIONAL: Denies unexplained weight loss, fevers, chills or fatigue  NEUROLOGICAL: Denies unsteady gait or progressive weakness  MUSCULOSKELETAL: Denies joint swelling or redness  GI: Denies nausea, vomiting, diarrhea   : Denies bowel or the left lower extremity does not show any tenderness, deformity or injury. Range of motion is normal and pain-free. There is no gross instability. There are no rashes, ulcerations or lesions. Strength and tone are normal. No atrophy or abnormal movements are noted. Diagnostic Testing:    MR Lumbar spine shows 4/18/19      No acute abnormality in the lumbar spine.       Degenerative disc disease as described above, exacerbating congenitally   narrow lumbar spinal canal.       Spinal canal narrowing, minimal at L3-4.       Foraminal narrowing, mild at right L4-5, minimal at bilateral L3-4 and left   L4-5.           Results for orders placed or performed during the hospital encounter of 02/09/18   CBC   Result Value Ref Range    WBC 8.0 4.0 - 11.0 K/uL    RBC 3.79 (L) 4.20 - 5.90 M/uL    Hemoglobin 11.7 (L) 13.5 - 17.5 g/dL    Hematocrit 35.4 (L) 40.5 - 52.5 %    MCV 93.4 80.0 - 100.0 fL    MCH 30.8 26.0 - 34.0 pg    MCHC 33.0 31.0 - 36.0 g/dL    RDW 14.6 12.4 - 15.4 %    Platelets 302 616 - 911 K/uL    MPV 6.7 5.0 - 10.5 fL   BASIC METABOLIC PANEL   Result Value Ref Range    Sodium 142 136 - 145 mmol/L    Potassium 4.8 3.5 - 5.1 mmol/L    Chloride 105 99 - 110 mmol/L    CO2 23 21 - 32 mmol/L    Anion Gap 14 3 - 16    Glucose 115 (H) 70 - 99 mg/dL    BUN 19 7 - 20 mg/dL    CREATININE 1.4 (H) 0.8 - 1.3 mg/dL    GFR Non-African American 50 (A) >60    GFR African American >60 >60    Calcium 9.4 8.3 - 10.6 mg/dL   MAGNESIUM   Result Value Ref Range    Magnesium 2.30 1.80 - 2.40 mg/dL     Impression:       1. Lumbar radiculopathy    2. Lumbar foraminal stenosis        Plan:  Clinical Course: Above diagnoses are improving     I discussed the diagnosis and the treatment options with Vero Lepe today. In Summary:  The various treatment options were outlined and discussed with Vero Lepe including:  Conservative care options: physical therapy, ice, medications, bracing, and activity modification.  The indications for therapeutic injections. The indications for additional imaging/laboratory studies. The indications for (possible future) interventions. After considering the various options discussed, Smiley Chadwick elected to pursue a course of treatment that includes the followin. Medications:  Continue anti-inflammatories with appropriate GI Precautions including to stop if develop dark tarry stools or GI upset and to take with food. 2. PT:  Encouraged to continue with HEP. 3. Further studies:  No further studies. 4. Interventional:  Significant improvement after LESI    5. Follow up:  4-6 weeks      Smiley Chadwick was instructed to call the office if his symptoms worsen or if new symptoms appear prior to the next scheduled visit. He is specifically instructed to contact the office between now & his scheduled appointment if he has concerns related to his condition or if he needs assistance in scheduling the above tests. He is welcome to call for an appointment sooner if he has any additional concerns or questions. Ga Milian ATC, am scribing for and in the presence of Dr. Pradeep Park. 19 2:50 PM Katy Willett ATC    I, Dr. Robyn Martinez. Sanchez, personally performed the services described in this documentation as scribed by AGUEDA Dubois in my presence and it is both accurate and complete. Keaton Goins. Linda Obando MD, MARIA ISABEL, [facility]  Board Certified in [address] [place] [address] Certified and Fellowship Trained in [facility] ([facility])             This dictation was performed with a verbal recognition program [facility]) and it was checked for errors. It is possible that there are still dictated errors within this office note. If so, please bring any errors to my attention for an addendum. All efforts were made to ensure that this office note is accurate.

## 2019-06-27 ENCOUNTER — TELEPHONE (OUTPATIENT)
Dept: ORTHOPEDIC SURGERY | Age: 69
End: 2019-06-27

## 2019-06-27 RX ORDER — METHYLPREDNISOLONE 4 MG/1
TABLET ORAL
Qty: 1 KIT | Refills: 0 | Status: SHIPPED | OUTPATIENT
Start: 2019-06-27 | End: 2019-07-17 | Stop reason: ALTCHOICE

## 2019-07-03 ENCOUNTER — TELEPHONE (OUTPATIENT)
Dept: VASCULAR SURGERY | Age: 69
End: 2019-07-03

## 2019-07-17 ENCOUNTER — OFFICE VISIT (OUTPATIENT)
Dept: CARDIOLOGY CLINIC | Age: 69
End: 2019-07-17
Payer: MEDICARE

## 2019-07-17 VITALS
SYSTOLIC BLOOD PRESSURE: 122 MMHG | OXYGEN SATURATION: 98 % | WEIGHT: 208 LBS | HEIGHT: 68 IN | BODY MASS INDEX: 31.52 KG/M2 | HEART RATE: 57 BPM | DIASTOLIC BLOOD PRESSURE: 64 MMHG

## 2019-07-17 DIAGNOSIS — R00.2 HEART PALPITATIONS: ICD-10-CM

## 2019-07-17 DIAGNOSIS — Z95.1 S/P CABG X 4: Primary | ICD-10-CM

## 2019-07-17 PROCEDURE — G8598 ASA/ANTIPLAT THER USED: HCPCS | Performed by: NURSE PRACTITIONER

## 2019-07-17 PROCEDURE — 1123F ACP DISCUSS/DSCN MKR DOCD: CPT | Performed by: NURSE PRACTITIONER

## 2019-07-17 PROCEDURE — 93000 ELECTROCARDIOGRAM COMPLETE: CPT | Performed by: NURSE PRACTITIONER

## 2019-07-17 PROCEDURE — 1036F TOBACCO NON-USER: CPT | Performed by: NURSE PRACTITIONER

## 2019-07-17 PROCEDURE — G8417 CALC BMI ABV UP PARAM F/U: HCPCS | Performed by: NURSE PRACTITIONER

## 2019-07-17 PROCEDURE — 99214 OFFICE O/P EST MOD 30 MIN: CPT | Performed by: NURSE PRACTITIONER

## 2019-07-17 PROCEDURE — 3017F COLORECTAL CA SCREEN DOC REV: CPT | Performed by: NURSE PRACTITIONER

## 2019-07-17 PROCEDURE — 4040F PNEUMOC VAC/ADMIN/RCVD: CPT | Performed by: NURSE PRACTITIONER

## 2019-07-17 PROCEDURE — G8427 DOCREV CUR MEDS BY ELIG CLIN: HCPCS | Performed by: NURSE PRACTITIONER

## 2019-07-17 NOTE — PROGRESS NOTES
50%  Cors: LM - nl, LAD - proximal stents (2) with instent stenosis up to 95%, 70% between dx2 and dx3  DX1 - totalled with faint filling, DX2 moderate in caliber, RI - small with 90% ostial, LCX - 40% ostial, 50% between om1 and om2, mid stent and stent in OM2, 40% after mid stent, OM3 70%   RCA - dominant with 50% prox, long stented mid vessel with instent stenoses of 50-80%  Ff by 90% with another stent at RCA/PDA with instent restenosis of 95% Faint r-l collateral to the area of dx1  GXT 1/24/18: medium sized inferolateral fixed defect of moderate intensity consistent w/ infarction in territory of LCx and/or RCA. Small to medium sized anterior completely reversible defect of moderate intensity consistent with ischemia in territory of LAD. Small to medium sized inferolateral completely reversible defect of moderate intensity consistent with ischemia in Lcx and/or RCA. EF 47%. Referred to angiogram  Echo 4/28/17: EF 55%. There is inferior hypokinesis. Mild cLVH. Diastolic filling parameters suggests grade I diastolic dysfunction. Mild mitral annular calcification is present. Aortic valve appears sclerotic but opens adequately. trivial mitral, pulmonic and tricuspid regurgitation with RVSP 25 mmHg. Cath 7/10/15: LVEDP - 16, EF 40%  Cors: LM - nl. LAD - diffuse disease with long stented area in the prox and mid vessel with diffuse instent 40%, distal apical 90%. LCX - 40% ostial, 30% before LCX stent, 40% just prox to large PL branch, bifurcation stents in the LCX-OM3 junction which are patent.  OM1 small with 90% ostial  RCA - dom, diffusely diseased, 50%prox, long stented area with localized 60% stenosis, 50% between stents, 2nd stent prox to PDA is patent, PDA - prox 50%  FFR of RCA through all diseased segments was 0.85 ff 2 min of adenosine  Echo 6/2915 [facility]) [address], mildly reduced EF 50-55%, right ventricle mildly dilated, right atrium mildly dilated, left ventricular posterior hypokinesis  Cath 6/29/15

## 2019-08-01 DIAGNOSIS — E78.2 MIXED HYPERLIPIDEMIA: Primary | ICD-10-CM

## 2019-08-02 ENCOUNTER — TELEPHONE (OUTPATIENT)
Dept: ORTHOPEDIC SURGERY | Age: 69
End: 2019-08-02

## 2019-08-02 RX ORDER — ROSUVASTATIN CALCIUM 10 MG/1
TABLET, COATED ORAL
Qty: 90 TABLET | Refills: 1 | Status: SHIPPED | OUTPATIENT
Start: 2019-08-02 | End: 2020-02-14

## 2019-08-09 ENCOUNTER — TELEPHONE (OUTPATIENT)
Dept: CARDIOLOGY CLINIC | Age: 69
End: 2019-08-09

## 2019-08-09 ENCOUNTER — OFFICE VISIT (OUTPATIENT)
Dept: ORTHOPEDIC SURGERY | Age: 69
End: 2019-08-09
Payer: MEDICARE

## 2019-08-09 ENCOUNTER — TELEPHONE (OUTPATIENT)
Dept: ORTHOPEDIC SURGERY | Age: 69
End: 2019-08-09

## 2019-08-09 VITALS
WEIGHT: 207.89 LBS | SYSTOLIC BLOOD PRESSURE: 150 MMHG | DIASTOLIC BLOOD PRESSURE: 83 MMHG | HEIGHT: 68 IN | HEART RATE: 62 BPM | BODY MASS INDEX: 31.51 KG/M2

## 2019-08-09 DIAGNOSIS — M48.061 LUMBAR FORAMINAL STENOSIS: ICD-10-CM

## 2019-08-09 DIAGNOSIS — M17.4 OTHER SECONDARY OSTEOARTHRITIS OF BOTH KNEES: ICD-10-CM

## 2019-08-09 DIAGNOSIS — M54.16 LUMBAR RADICULOPATHY: Primary | ICD-10-CM

## 2019-08-09 PROCEDURE — 1036F TOBACCO NON-USER: CPT | Performed by: PHYSICAL MEDICINE & REHABILITATION

## 2019-08-09 PROCEDURE — 3017F COLORECTAL CA SCREEN DOC REV: CPT | Performed by: PHYSICAL MEDICINE & REHABILITATION

## 2019-08-09 PROCEDURE — G8427 DOCREV CUR MEDS BY ELIG CLIN: HCPCS | Performed by: PHYSICAL MEDICINE & REHABILITATION

## 2019-08-09 PROCEDURE — G8598 ASA/ANTIPLAT THER USED: HCPCS | Performed by: PHYSICAL MEDICINE & REHABILITATION

## 2019-08-09 PROCEDURE — G8417 CALC BMI ABV UP PARAM F/U: HCPCS | Performed by: PHYSICAL MEDICINE & REHABILITATION

## 2019-08-09 PROCEDURE — 4040F PNEUMOC VAC/ADMIN/RCVD: CPT | Performed by: PHYSICAL MEDICINE & REHABILITATION

## 2019-08-09 PROCEDURE — 1123F ACP DISCUSS/DSCN MKR DOCD: CPT | Performed by: PHYSICAL MEDICINE & REHABILITATION

## 2019-08-09 PROCEDURE — 99214 OFFICE O/P EST MOD 30 MIN: CPT | Performed by: PHYSICAL MEDICINE & REHABILITATION

## 2019-08-09 NOTE — PROGRESS NOTES
Follow up: Zoe Molina  1950  [id number]         Chief Complaint   Patient presents with    Lower Back Pain     F/u LSP         HISTORY OF PRESENT ILLNESS:  Mr. Shavon Mcdowell is a 71 y.o. male returns for a follow up visit for multiple medical problems. His current presenting problems are   1. Lumbar radiculopathy    2. Lumbar foraminal stenosis    3. Other secondary osteoarthritis of both knees    . As per information/history obtained from the PADT(patient assessment and documentation tool) - He complains of pain in the lower back with radiation to the lower leg Bilateral He rates the pain 7/10 and describes it as sharp, aching, burning. 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 He denies side effects from the current pain regimen. Patient reports that since the last follow up visit the physical functioning is worse, family/social relationships are worse, mood is worse and sleep patterns are worse, and that the overall functioning is worse. Patient denies neurological bowel or bladder. Patient presents today for follow up of low back and bilateral leg pain. He was last seen in June following left L5 and left S1 transforaminal epidural steroid injection on 5/15/19. He states increase in pain over the past 2 weeks with no new injuries or triggers. 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 He also notes bilateral lower legs have been swelling intermittently recently.         Associated signs and symptoms:   Neurogenic bowel or bladder symptoms:  no   Perceived weakness:  yes   Difficulty walking:  yes              Past Medical History:   Past Medical History:   Diagnosis Date    CAD (coronary artery disease)     Hyperlipidemia     Hypertension     Obstructive apnea     Swallowing difficulty     pt to f/u with an ENT    Thyroid disease       Past Surgical History:     Past Surgical History:   Procedure Laterality Date    CARDIAC

## 2019-08-09 NOTE — TELEPHONE ENCOUNTER
Dr Meme Mijares is want pt to have an ARIA injection on 8/26/19 and asking if pt can hold plavix 7 days prior. Please put letter in epic.  Any questions please call

## 2019-08-11 PROCEDURE — 93228 REMOTE 30 DAY ECG REV/REPORT: CPT | Performed by: INTERNAL MEDICINE

## 2019-08-19 ENCOUNTER — TELEPHONE (OUTPATIENT)
Dept: ORTHOPEDIC SURGERY | Age: 69
End: 2019-08-19

## 2019-08-26 ENCOUNTER — HOSPITAL ENCOUNTER (OUTPATIENT)
Age: 69
Setting detail: OUTPATIENT SURGERY
Discharge: HOME | End: 2019-08-26
Attending: PHYSICAL MEDICINE & REHABILITATION | Admitting: PHYSICAL MEDICINE & REHABILITATION
Payer: MEDICARE

## 2019-08-26 ENCOUNTER — APPOINTMENT (OUTPATIENT)
Dept: GENERAL RADIOLOGY | Age: 69
End: 2019-08-26
Attending: PHYSICAL MEDICINE & REHABILITATION
Payer: MEDICARE

## 2019-08-26 VITALS
RESPIRATION RATE: 16 BRPM | OXYGEN SATURATION: 100 % | TEMPERATURE: 97.9 F | DIASTOLIC BLOOD PRESSURE: 81 MMHG | WEIGHT: 204 LBS | HEIGHT: 67 IN | HEART RATE: 49 BPM | BODY MASS INDEX: 32.02 KG/M2 | SYSTOLIC BLOOD PRESSURE: 128 MMHG

## 2019-08-26 PROCEDURE — 3610000056 HC PAIN LEVEL 4 BASE (NON-OR): Performed by: PHYSICAL MEDICINE & REHABILITATION

## 2019-08-26 PROCEDURE — 2709999900 HC NON-CHARGEABLE SUPPLY: Performed by: PHYSICAL MEDICINE & REHABILITATION

## 2019-08-26 PROCEDURE — 6360000002 HC RX W HCPCS: Performed by: PHYSICAL MEDICINE & REHABILITATION

## 2019-08-26 PROCEDURE — 2500000003 HC RX 250 WO HCPCS: Performed by: PHYSICAL MEDICINE & REHABILITATION

## 2019-08-26 PROCEDURE — 99152 MOD SED SAME PHYS/QHP 5/>YRS: CPT | Performed by: PHYSICAL MEDICINE & REHABILITATION

## 2019-08-26 PROCEDURE — 3209999900 FLUORO FOR SURGICAL PROCEDURES

## 2019-08-26 RX ORDER — LIDOCAINE HYDROCHLORIDE 10 MG/ML
INJECTION, SOLUTION INFILTRATION; PERINEURAL
Status: COMPLETED | OUTPATIENT
Start: 2019-08-26 | End: 2019-08-26

## 2019-08-26 RX ORDER — LOSARTAN POTASSIUM 25 MG/1
TABLET ORAL
Qty: 90 TABLET | Refills: 3 | Status: SHIPPED | OUTPATIENT
Start: 2019-08-26 | End: 2020-08-14

## 2019-08-26 RX ORDER — MIDAZOLAM HYDROCHLORIDE 1 MG/ML
INJECTION INTRAMUSCULAR; INTRAVENOUS
Status: COMPLETED | OUTPATIENT
Start: 2019-08-26 | End: 2019-08-26

## 2019-08-26 RX ORDER — METHYLPREDNISOLONE ACETATE 80 MG/ML
INJECTION, SUSPENSION INTRA-ARTICULAR; INTRALESIONAL; INTRAMUSCULAR; SOFT TISSUE
Status: COMPLETED | OUTPATIENT
Start: 2019-08-26 | End: 2019-08-26

## 2019-08-26 RX ORDER — BUPIVACAINE HYDROCHLORIDE 5 MG/ML
INJECTION, SOLUTION EPIDURAL; INTRACAUDAL
Status: COMPLETED | OUTPATIENT
Start: 2019-08-26 | End: 2019-08-26

## 2019-08-26 ASSESSMENT — PAIN SCALES - GENERAL: PAINLEVEL_OUTOF10: 0

## 2019-08-26 ASSESSMENT — PAIN DESCRIPTION - DESCRIPTORS: DESCRIPTORS: BURNING;THROBBING

## 2019-08-26 ASSESSMENT — PAIN - FUNCTIONAL ASSESSMENT: PAIN_FUNCTIONAL_ASSESSMENT: 0-10

## 2019-08-26 NOTE — PROGRESS NOTES
IV discontinued, catheter intact, and dressing applied. Procedural dressing dry and intact. Bilateral  extremities equal in strength. Discharge instructions reviewed with patient or responsible adult, signed and copy given. All home medications have been reviewed. All questions answered and patient or responsible adult verbalized understanding.   PAIN LEVEL AT DISCHARGE __0___

## 2019-09-13 ENCOUNTER — OFFICE VISIT (OUTPATIENT)
Dept: ORTHOPEDIC SURGERY | Age: 69
End: 2019-09-13
Payer: MEDICARE

## 2019-09-13 VITALS
DIASTOLIC BLOOD PRESSURE: 84 MMHG | HEART RATE: 80 BPM | BODY MASS INDEX: 32.01 KG/M2 | WEIGHT: 203.93 LBS | HEIGHT: 67 IN | SYSTOLIC BLOOD PRESSURE: 128 MMHG

## 2019-09-13 DIAGNOSIS — M54.16 LUMBAR RADICULOPATHY: Primary | ICD-10-CM

## 2019-09-13 DIAGNOSIS — M51.36 DDD (DEGENERATIVE DISC DISEASE), LUMBAR: ICD-10-CM

## 2019-09-13 DIAGNOSIS — M47.816 SPONDYLOSIS OF LUMBAR REGION WITHOUT MYELOPATHY OR RADICULOPATHY: ICD-10-CM

## 2019-09-13 PROCEDURE — G8427 DOCREV CUR MEDS BY ELIG CLIN: HCPCS | Performed by: PHYSICAL MEDICINE & REHABILITATION

## 2019-09-13 PROCEDURE — 3017F COLORECTAL CA SCREEN DOC REV: CPT | Performed by: PHYSICAL MEDICINE & REHABILITATION

## 2019-09-13 PROCEDURE — 1123F ACP DISCUSS/DSCN MKR DOCD: CPT | Performed by: PHYSICAL MEDICINE & REHABILITATION

## 2019-09-13 PROCEDURE — 1036F TOBACCO NON-USER: CPT | Performed by: PHYSICAL MEDICINE & REHABILITATION

## 2019-09-13 PROCEDURE — G8417 CALC BMI ABV UP PARAM F/U: HCPCS | Performed by: PHYSICAL MEDICINE & REHABILITATION

## 2019-09-13 PROCEDURE — G8598 ASA/ANTIPLAT THER USED: HCPCS | Performed by: PHYSICAL MEDICINE & REHABILITATION

## 2019-09-13 PROCEDURE — 4040F PNEUMOC VAC/ADMIN/RCVD: CPT | Performed by: PHYSICAL MEDICINE & REHABILITATION

## 2019-09-13 PROCEDURE — 99213 OFFICE O/P EST LOW 20 MIN: CPT | Performed by: PHYSICAL MEDICINE & REHABILITATION

## 2019-09-13 NOTE — PROGRESS NOTES
Follow up: Jessica Mcdermott  1950  [id number]         Chief Complaint   Patient presents with    Lower Back Pain     8/26: R L5 + L L5 TF          HISTORY OF PRESENT ILLNESS:  Mr. Kameron rIving is a 71 y.o. male returns for a follow up visit for multiple medical problems. His current presenting problems are   1. Lumbar radiculopathy    2. DDD (degenerative disc disease), lumbar    3. Spondylosis of lumbar region without myelopathy or radiculopathy    . As per information/history obtained from the PADT(patient assessment and documentation tool) - He complains of pain in the lower back with radiation to the lower leg Bilateral He rates the pain 2/10 and describes it as burning. 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 He denies side effects from the current pain regimen. Patient reports that since the last follow up visit the physical functioning is better, family/social relationships are better, mood is better and sleep patterns are better, and that the overall functioning is better. Patient denies neurological bowel or bladder. Patient underwent a transforaminal lumbar ARIA right L5 + left L5 on 8/26/2019. The patient reports 80% improvement of symptoms. He describes his pain as constant in nature. However, the intensity has decreased since his procedure. He also reports that the swelling in his legs has dissipated. The patient denies any new injuries or triggers at this time. The patient is not currently ambulating with any assistive devices in the office today.          Associated signs and symptoms:   Neurogenic bowel or bladder symptoms:  no   Perceived weakness:  no   Difficulty walking:  no              Past Medical History:   Past Medical History:   Diagnosis Date    CAD (coronary artery disease)     Hyperlipidemia     Hypertension     Obstructive apnea     Swallowing difficulty     pt to f/u with an ENT    Thyroid disease       Past Surgical History:     Past Surgical History:   Procedure

## 2019-11-22 RX ORDER — CARVEDILOL 3.12 MG/1
TABLET ORAL
Qty: 180 TABLET | Refills: 3 | Status: SHIPPED | OUTPATIENT
Start: 2019-11-22 | End: 2020-11-16

## 2019-12-06 ENCOUNTER — OFFICE VISIT (OUTPATIENT)
Dept: PULMONOLOGY | Age: 69
End: 2019-12-06
Payer: MEDICARE

## 2019-12-06 VITALS
HEIGHT: 67 IN | WEIGHT: 208 LBS | SYSTOLIC BLOOD PRESSURE: 122 MMHG | BODY MASS INDEX: 32.65 KG/M2 | OXYGEN SATURATION: 98 % | DIASTOLIC BLOOD PRESSURE: 72 MMHG | HEART RATE: 60 BPM

## 2019-12-06 DIAGNOSIS — I10 ESSENTIAL HYPERTENSION, BENIGN: ICD-10-CM

## 2019-12-06 DIAGNOSIS — E66.2 CLASS 1 OBESITY WITH ALVEOLAR HYPOVENTILATION WITHOUT SERIOUS COMORBIDITY WITH BODY MASS INDEX (BMI) OF 31.0 TO 31.9 IN ADULT (HCC): ICD-10-CM

## 2019-12-06 DIAGNOSIS — I25.10 CORONARY ARTERY DISEASE INVOLVING NATIVE CORONARY ARTERY OF NATIVE HEART WITHOUT ANGINA PECTORIS: Chronic | ICD-10-CM

## 2019-12-06 DIAGNOSIS — G47.33 OBSTRUCTIVE APNEA: Primary | Chronic | ICD-10-CM

## 2019-12-06 PROCEDURE — G8427 DOCREV CUR MEDS BY ELIG CLIN: HCPCS | Performed by: NURSE PRACTITIONER

## 2019-12-06 PROCEDURE — 99214 OFFICE O/P EST MOD 30 MIN: CPT | Performed by: NURSE PRACTITIONER

## 2019-12-06 PROCEDURE — 3017F COLORECTAL CA SCREEN DOC REV: CPT | Performed by: NURSE PRACTITIONER

## 2019-12-06 PROCEDURE — 1036F TOBACCO NON-USER: CPT | Performed by: NURSE PRACTITIONER

## 2019-12-06 PROCEDURE — G8598 ASA/ANTIPLAT THER USED: HCPCS | Performed by: NURSE PRACTITIONER

## 2019-12-06 PROCEDURE — G8484 FLU IMMUNIZE NO ADMIN: HCPCS | Performed by: NURSE PRACTITIONER

## 2019-12-06 PROCEDURE — 1123F ACP DISCUSS/DSCN MKR DOCD: CPT | Performed by: NURSE PRACTITIONER

## 2019-12-06 PROCEDURE — 4040F PNEUMOC VAC/ADMIN/RCVD: CPT | Performed by: NURSE PRACTITIONER

## 2019-12-06 PROCEDURE — G8417 CALC BMI ABV UP PARAM F/U: HCPCS | Performed by: NURSE PRACTITIONER

## 2019-12-06 ASSESSMENT — SLEEP AND FATIGUE QUESTIONNAIRES
HOW LIKELY ARE YOU TO NOD OFF OR FALL ASLEEP WHILE SITTING QUIETLY AFTER LUNCH WITHOUT ALCOHOL: 1
HOW LIKELY ARE YOU TO NOD OFF OR FALL ASLEEP WHEN YOU ARE A PASSENGER IN A CAR FOR AN HOUR WITHOUT A BREAK: 0
HOW LIKELY ARE YOU TO NOD OFF OR FALL ASLEEP WHILE SITTING AND TALKING TO SOMEONE: 0
HOW LIKELY ARE YOU TO NOD OFF OR FALL ASLEEP WHILE SITTING AND READING: 1
HOW LIKELY ARE YOU TO NOD OFF OR FALL ASLEEP IN A CAR, WHILE STOPPED FOR A FEW MINUTES IN TRAFFIC: 0
ESS TOTAL SCORE: 4
HOW LIKELY ARE YOU TO NOD OFF OR FALL ASLEEP WHILE SITTING INACTIVE IN A PUBLIC PLACE: 0
HOW LIKELY ARE YOU TO NOD OFF OR FALL ASLEEP WHILE WATCHING TV: 1
HOW LIKELY ARE YOU TO NOD OFF OR FALL ASLEEP WHILE LYING DOWN TO REST IN THE AFTERNOON WHEN CIRCUMSTANCES PERMIT: 1

## 2019-12-06 ASSESSMENT — ENCOUNTER SYMPTOMS
RHINORRHEA: 0
ABDOMINAL DISTENTION: 0
COUGH: 0
EYE PAIN: 0
ABDOMINAL PAIN: 0
SHORTNESS OF BREATH: 0
EYE REDNESS: 0
APNEA: 0
SINUS PRESSURE: 0

## 2019-12-13 ENCOUNTER — OFFICE VISIT (OUTPATIENT)
Dept: ORTHOPEDIC SURGERY | Age: 69
End: 2019-12-13
Payer: MEDICARE

## 2019-12-13 VITALS
SYSTOLIC BLOOD PRESSURE: 128 MMHG | DIASTOLIC BLOOD PRESSURE: 84 MMHG | WEIGHT: 207.89 LBS | HEIGHT: 67 IN | BODY MASS INDEX: 32.63 KG/M2

## 2019-12-13 DIAGNOSIS — M54.16 LUMBAR RADICULOPATHY: Primary | ICD-10-CM

## 2019-12-13 DIAGNOSIS — M51.36 DDD (DEGENERATIVE DISC DISEASE), LUMBAR: ICD-10-CM

## 2019-12-13 DIAGNOSIS — M47.816 SPONDYLOSIS OF LUMBAR REGION WITHOUT MYELOPATHY OR RADICULOPATHY: ICD-10-CM

## 2019-12-13 PROCEDURE — G8427 DOCREV CUR MEDS BY ELIG CLIN: HCPCS | Performed by: PHYSICAL MEDICINE & REHABILITATION

## 2019-12-13 PROCEDURE — G8484 FLU IMMUNIZE NO ADMIN: HCPCS | Performed by: PHYSICAL MEDICINE & REHABILITATION

## 2019-12-13 PROCEDURE — 99214 OFFICE O/P EST MOD 30 MIN: CPT | Performed by: PHYSICAL MEDICINE & REHABILITATION

## 2019-12-13 PROCEDURE — G8417 CALC BMI ABV UP PARAM F/U: HCPCS | Performed by: PHYSICAL MEDICINE & REHABILITATION

## 2019-12-13 PROCEDURE — 1036F TOBACCO NON-USER: CPT | Performed by: PHYSICAL MEDICINE & REHABILITATION

## 2019-12-13 PROCEDURE — G8598 ASA/ANTIPLAT THER USED: HCPCS | Performed by: PHYSICAL MEDICINE & REHABILITATION

## 2019-12-13 PROCEDURE — 1123F ACP DISCUSS/DSCN MKR DOCD: CPT | Performed by: PHYSICAL MEDICINE & REHABILITATION

## 2019-12-13 PROCEDURE — 4040F PNEUMOC VAC/ADMIN/RCVD: CPT | Performed by: PHYSICAL MEDICINE & REHABILITATION

## 2019-12-13 PROCEDURE — 3017F COLORECTAL CA SCREEN DOC REV: CPT | Performed by: PHYSICAL MEDICINE & REHABILITATION

## 2019-12-20 ENCOUNTER — TELEPHONE (OUTPATIENT)
Dept: ORTHOPEDIC SURGERY | Age: 69
End: 2019-12-20

## 2019-12-23 ENCOUNTER — APPOINTMENT (OUTPATIENT)
Dept: GENERAL RADIOLOGY | Age: 69
End: 2019-12-23
Attending: PHYSICAL MEDICINE & REHABILITATION
Payer: MEDICARE

## 2019-12-23 ENCOUNTER — HOSPITAL ENCOUNTER (OUTPATIENT)
Age: 69
Setting detail: OUTPATIENT SURGERY
Discharge: HOME | End: 2019-12-23
Attending: PHYSICAL MEDICINE & REHABILITATION | Admitting: PHYSICAL MEDICINE & REHABILITATION
Payer: MEDICARE

## 2019-12-23 VITALS
TEMPERATURE: 97.4 F | HEART RATE: 52 BPM | WEIGHT: 210 LBS | DIASTOLIC BLOOD PRESSURE: 87 MMHG | HEIGHT: 68 IN | BODY MASS INDEX: 31.83 KG/M2 | RESPIRATION RATE: 16 BRPM | OXYGEN SATURATION: 100 % | SYSTOLIC BLOOD PRESSURE: 148 MMHG

## 2019-12-23 PROCEDURE — 3610000056 HC PAIN LEVEL 4 BASE (NON-OR): Performed by: PHYSICAL MEDICINE & REHABILITATION

## 2019-12-23 PROCEDURE — 2500000003 HC RX 250 WO HCPCS: Performed by: PHYSICAL MEDICINE & REHABILITATION

## 2019-12-23 PROCEDURE — 2709999900 HC NON-CHARGEABLE SUPPLY: Performed by: PHYSICAL MEDICINE & REHABILITATION

## 2019-12-23 PROCEDURE — 99152 MOD SED SAME PHYS/QHP 5/>YRS: CPT | Performed by: PHYSICAL MEDICINE & REHABILITATION

## 2019-12-23 PROCEDURE — 3209999900 FLUORO FOR SURGICAL PROCEDURES

## 2019-12-23 PROCEDURE — 6360000002 HC RX W HCPCS: Performed by: PHYSICAL MEDICINE & REHABILITATION

## 2019-12-23 RX ORDER — MIDAZOLAM HYDROCHLORIDE 1 MG/ML
INJECTION INTRAMUSCULAR; INTRAVENOUS
Status: COMPLETED | OUTPATIENT
Start: 2019-12-23 | End: 2019-12-23

## 2019-12-23 RX ORDER — LIDOCAINE HYDROCHLORIDE 10 MG/ML
INJECTION, SOLUTION INFILTRATION; PERINEURAL
Status: COMPLETED | OUTPATIENT
Start: 2019-12-23 | End: 2019-12-23

## 2019-12-23 RX ORDER — BUPIVACAINE HYDROCHLORIDE 5 MG/ML
INJECTION, SOLUTION EPIDURAL; INTRACAUDAL
Status: COMPLETED | OUTPATIENT
Start: 2019-12-23 | End: 2019-12-23

## 2019-12-23 RX ORDER — BETAMETHASONE SODIUM PHOSPHATE AND BETAMETHASONE ACETATE 3; 3 MG/ML; MG/ML
INJECTION, SUSPENSION INTRA-ARTICULAR; INTRALESIONAL; INTRAMUSCULAR; SOFT TISSUE
Status: COMPLETED | OUTPATIENT
Start: 2019-12-23 | End: 2019-12-23

## 2019-12-23 ASSESSMENT — PAIN DESCRIPTION - DESCRIPTORS: DESCRIPTORS: BURNING

## 2019-12-23 ASSESSMENT — PAIN - FUNCTIONAL ASSESSMENT: PAIN_FUNCTIONAL_ASSESSMENT: 0-10

## 2019-12-23 ASSESSMENT — PAIN SCALES - GENERAL
PAINLEVEL_OUTOF10: 0
PAINLEVEL_OUTOF10: 0

## 2020-01-20 ENCOUNTER — TELEPHONE (OUTPATIENT)
Dept: CARDIOLOGY CLINIC | Age: 70
End: 2020-01-20

## 2020-01-20 ENCOUNTER — TELEPHONE (OUTPATIENT)
Dept: ORTHOPEDIC SURGERY | Age: 70
End: 2020-01-20

## 2020-01-20 ENCOUNTER — OFFICE VISIT (OUTPATIENT)
Dept: ORTHOPEDIC SURGERY | Age: 70
End: 2020-01-20
Payer: MEDICARE

## 2020-01-20 VITALS
SYSTOLIC BLOOD PRESSURE: 135 MMHG | WEIGHT: 210.1 LBS | BODY MASS INDEX: 31.84 KG/M2 | DIASTOLIC BLOOD PRESSURE: 76 MMHG | HEART RATE: 58 BPM | HEIGHT: 68 IN

## 2020-01-20 PROCEDURE — G8427 DOCREV CUR MEDS BY ELIG CLIN: HCPCS | Performed by: PHYSICIAN ASSISTANT

## 2020-01-20 PROCEDURE — 3017F COLORECTAL CA SCREEN DOC REV: CPT | Performed by: PHYSICIAN ASSISTANT

## 2020-01-20 PROCEDURE — 99214 OFFICE O/P EST MOD 30 MIN: CPT | Performed by: PHYSICIAN ASSISTANT

## 2020-01-20 PROCEDURE — 1123F ACP DISCUSS/DSCN MKR DOCD: CPT | Performed by: PHYSICIAN ASSISTANT

## 2020-01-20 PROCEDURE — G8484 FLU IMMUNIZE NO ADMIN: HCPCS | Performed by: PHYSICIAN ASSISTANT

## 2020-01-20 PROCEDURE — 4040F PNEUMOC VAC/ADMIN/RCVD: CPT | Performed by: PHYSICIAN ASSISTANT

## 2020-01-20 PROCEDURE — G8417 CALC BMI ABV UP PARAM F/U: HCPCS | Performed by: PHYSICIAN ASSISTANT

## 2020-01-20 PROCEDURE — 1036F TOBACCO NON-USER: CPT | Performed by: PHYSICIAN ASSISTANT

## 2020-01-20 NOTE — LETTER
Please schedule the following with:     Date:   20 @ 8:50     Account: [de-identified]  Patient: Siddharth Cisneros    : 1950  Address:  [address] [place] [address]    Phone (V):  [phone] (home)      ----------------------------------------------------------------------------------------------  Diagnosis:     ICD-10-CM    1. Lumbar radiculopathy M54.16    2. DDD (degenerative disc disease), lumbar M51.36    3. Spondylosis of lumbar region without myelopathy or radiculopathy M47.816    4. Lumbar foraminal stenosis M48.061      Procedure: Transforaminal Epidural Steroid Injection     Levels: L4   Side: Bilateral   CPT Codes 56491    ----------------------------------------------------------------------------------------------  Injection # ARIA 1  ASC    Attending Physician       Eitan Baron. Duglas De Jesus MD.      ----------------------------------------------------------------------------------------------  Injection Scheduled For:    At:    [employer] [facility]    Pre-Cert#    [id number]    Pre-Cert#    Comments or Special instructions:     · Infection control  ? Tested positive for MRSA in past 12 months:  no  ? Tested positive for MSSA \"staph infection\" in past 12 months: no  ? Tested positive for VRE (Vancomycin Resistant Enterococci) in past 12 months:   no  ? Currently on any antibiotics for an infection: no  · Anticoagulants:  ? On a blood thinner:  ASA DO NOT HOLD AND PLAVIX HOLD 7 DAYS DR Farr Party [phone]   ?  Any history of bleeding disorder: no   · Advanced Liver disease: no   · Advanced Renal disease: no   · Glaucoma: no   · Diabetes: no      Sedation:         No  -----------------------------------------------------------------------------------------------  Allergies   Allergen Reactions    Statins Other (See Comments)     Atorvastatin & pravastatin: myalgia    Niacin And Related

## 2020-01-20 NOTE — TELEPHONE ENCOUNTER
L/m for approval to hold PLAVIX for 7 days prior to [facility] on 02/12/20. Patient aware of hold date.

## 2020-01-20 NOTE — TELEPHONE ENCOUNTER
CARDIAC CLEARANCE     What type of procedure are you having? ARIA    Which physician is performing your procedure? Dr. Yesica Das    When is your procedure scheduled for? 02/12    Where are you having this procedure? [facility]    Are you taking Blood Thinners? If so what? Yes (Name/dose/frequesncy)     Does the surgeon want you to stop your blood thinner? If so for how long? Plavix, 7 days prior to procedure    Phone Number and Contact Name for Physicians office:  Johann Benitez  [phone]  Fax number to send information:  [phone]    Cardiac Clearance not needed; medication hold Only. Per telephone call today; pt was notified    [place], [place]         1/20/20 3:39 PM   Note      L/m for approval to hold PLAVIX for 7 days prior to [facility] on 02/12/20.  Patient aware of hold date.

## 2020-01-20 NOTE — PROGRESS NOTES
Follow up: Cathy Avila  1950  [id number]         Chief Complaint   Patient presents with    Lower Back Pain     F/u Right L5 & Left L5 TF 12/3         HISTORY OF PRESENT ILLNESS:  Mr. Jaiden Vela is a 71 y.o. male returns for a follow up visit for multiple medical problems. His current presenting problems are   1. Lumbar radiculopathy    2. DDD (degenerative disc disease), lumbar    3. Spondylosis of lumbar region without myelopathy or radiculopathy    4. Lumbar foraminal stenosis    . As per information/history obtained from the PADT(patient assessment and documentation tool) - He complains of pain in the lower back with radiation to the upper leg Bilateral and lower leg Bilateral He rates the pain 10/10 and describes it as sharp, burning. Pain is made worse by: movement, karata. He denies side effects from the current pain regimen. Patient reports that since the last follow-up visit the physical functioning is worse, family/social relationships are worse, mood is worse and sleep patterns are worse, and that overall functioning is worse. Patient denies neurological bowel or bladder. Mr. Jaiden Vela presents today for follow up of his low back and bilateral leg pain. He recently underwent a right L5 and left L5 transforminal epidural steroid on 12/23/19. This was his 3rd epidural steroid injection. He notes no improvement overall with this injection, which he notes is upsetting as the other two injections were beneficial for his pain. He denies any new injuries or triggers to his back or legs since his procedure. He also denies any side effects to his procedure.         Associated signs and symptoms:   Neurogenic bowel or bladder symptoms:  no   Perceived weakness:  no   Difficulty walking:  no              Past Medical History:   Past Medical History:   Diagnosis Date    CAD (coronary artery disease)     Hyperlipidemia     Hypertension     Obstructive apnea     Swallowing difficulty History:   Family History   Problem Relation Age of Onset    Heart Failure Mother     Liver Disease Mother     Alzheimer's Disease Mother        REVIEW OF SYSTEMS:   CONSTITUTIONAL: Denies unexplained weight loss, fevers, chills or fatigue  NEUROLOGICAL: Denies unsteady gait or progressive weakness  MUSCULOSKELETAL: Denies joint swelling or redness  GI: Denies nausea, vomiting, diarrhea   : Denies bowel or bladder issues       PHYSICAL EXAM:    Vitals: Blood pressure 135/76, pulse 58, height 5' 7.99\" (1.727 m), weight 210 lb 1.6 oz (95.3 kg). GENERAL EXAM:  · General Apparence: Patient is adequately groomed with no evidence of malnutrition. · Psychiatric: Orientation: The patient is oriented to time, place and person. The patient's mood and affect are appropriate   · Vascular: Examination reveals no swelling and palpation reveals no tenderness in upper or lower extremities. Good capillary refill. · The lymphatic examination of the neck, axillae and groin reveals all areas to be without enlargement or induration  · Sensation is intact without deficit in the upper and lower extremities to light touch and pinprick  · Coordination of the upper and lower extremities are normal.  · RIGHT UPPER EXTREMITY:  Inspection/examination of the right upper extremity does not show any tenderness, deformity or injury. Range of motion is unremarkable and pain-free. There is no gross instability. There are no rashes, ulcerations or lesions. Strength and tone are normal. No atrophy or abnormal movements are noted. · LEFT UPPER EXTREMITY: Inspection/examination of the left upper extremity does not show any tenderness, deformity or injury. Range of motion is unremarkable and pain-free. There is no gross instability. There are no rashes, ulcerations or lesions. Strength and tone are normal. No atrophy or abnormal movements are noted. LUMBAR/SACRAL EXAMINATION:  · Inspection: Local inspection shows no step-off or bruising. Lumbar alignment is normal. No instability is noted. · Palpation:   No evidence of tenderness at the midline. Lumbar paraspinal tenderness: No tenderness to palpation of the lumbar paraspinals  Bursal tenderness No tenderness bilaterally  There is no paraspinal spasm. · Range of Motion: pain-free ROM  · Strength:   Strength testing is 5/5 in all muscle groups tested, except with hip extension on the left at a 4/5. · Special Tests:   Straight leg raise positive left greater than right and crossed SLR negative. Ranulfo's testing is negative bilaterally. FADIR's testing is negative bilaterally. · Skin: There are no rashes, ulcerations or lesions. · Reflexes: Reflexes are symmetrically 2+ at the patellar and ankle tendons. Clonus absent bilaterally at the feet. · Gait & station: normal, patient ambulates without assistance and no ataxia  · Additional Examinations:  · RIGHT LOWER EXTREMITY: Inspection/examination of the right lower extremity does not show any tenderness, deformity or injury. Range of motion is normal and pain-free. There is no gross instability. There are no rashes, ulcerations or lesions. Strength and tone are normal. No atrophy or abnormal movements are noted. · LEFT LOWER EXTREMITY:  Inspection/examination of the left lower extremity does not show any tenderness, deformity or injury. Range of motion is normal and pain-free. There is no gross instability. There are no rashes, ulcerations or lesions. Strength and tone are normal. No atrophy or abnormal movements are noted.       Diagnostic Testing:    MR Lumbar spine shows  4/18/19:  Impression   MRI cervical spine:       No acute abnormality in the cervical spine.       Degenerative disc disease as described above.       No spinal canal narrowing.       Foraminal narrowing, mild at right C5-6.       MRI lumbar spine:       No acute abnormality in the lumbar spine.       Degenerative disc disease as described above, exacerbating congenitally medications. 2. PT:  Encouraged to continue with HEP. 3. Further studies:  No further studies. 4. Interventional:  We discussed pursuing a Bilateral L4 TF epidural steroid injection to address the pain. Radiologic imaging and symptoms confirm the pain etiology. Risks, benefits and alternatives of interventional options were discussed. These include and are not limited to bleeding, infection, spinal headache, nerve injury and lack of pain relief. The patient verbalized understanding and would like to proceed. The patient will be scheduled accordingly. 5. Follow up:  4-6 weeks      Earlylisandro Wilson was instructed to call the office if his symptoms worsen or if new symptoms appear prior to the next scheduled visit. He is specifically instructed to contact the office between now & his scheduled appointment if he has concerns related to his condition or if he needs assistance in scheduling the above tests. He is welcome to call for an appointment sooner if he has any additional concerns or questions. IFrancesca ATC, am scribing for and in the presence of Jessica Govea. [address], [place].  01/20/20 3:03 PM Francesca Benjamin. The physical examination was performed between the patient and Jessica Govea. CHARITY Gallegos. All counseling during the appointment was performed between the patient and the provider. Ni Gallegos PA-C, personally performed the services described in this documentation as scribed by Francesca Orosco ATC in my presence and it is both accurate and complete. MAXIMILIANO Looney PA-C  Board Certified by the [employer] on Certification of Physician Assistants  Kimberli Cunha 58  Partner of [facility] ([facility])         This dictation was performed with a verbal recognition program [facility]) and it was checked for errors. It is possible that there are still dictated errors within this office note.  If so, please bring any errors to my attention for an addendum. All efforts were made to ensure that this office note is accurate.

## 2020-01-29 ENCOUNTER — TELEPHONE (OUTPATIENT)
Dept: ORTHOPEDIC SURGERY | Age: 70
End: 2020-01-29

## 2020-01-29 NOTE — TELEPHONE ENCOUNTER
DOS   02/12/2020  CPT   95747.50  DX   M54.16   M51.36   M47.816   M48.061  OP SX AUTH  NPR    BILATERAL  LEVELS   L4   PROCEDURE   TRANS FORAMINAL ARIA    [facility],[address]:   MEDICARE

## 2020-01-30 NOTE — PROGRESS NOTES
PATIENT REACHED   YES____NO__X__    PREOP INSTUCTIONS LEFT ON VM VQVVTI___[phone]____________      DATE__2/12/2020_______ TIME__0850_______ARRIVAL__0750______PLACE__[facility]__________  NOTHING TO EAT OR DRINK  AFTER MIDNIGHT THE EVENING PRIOR OR AS INSTRUCTED BY YOUR DR.  Darren Armenta NEED A RESPONSIBLE ADULT AGE 18 OR OLDER TO DRIVE YOU HOME  PLEASE BRING INSURANCE CARD. PICTURE ID AND COMPLETE LIST OF MEDS  WEAR LOOSE COMFORTABLE CLOTHING  FOLLOW ANY INSTRUCTIONS YOUR DRS OFFICE HAS GIVEN YOU,INCLUDING WHAT MEDICATIONS TO TAKE THE AM OF PROCEDURE AND WHEN AND IF YOU NEED TO STOP ANY BLOOD THINNERS. IF YOU HAVE QUESTIONS REGARDING THIS CALL THE OFFICE  THE GOAL BLOOD SUGAR THE AM OF PROCEDURE  OR LESS ABOVE THAT THE PROCEDURE MAY BE CANCELLED  ANY QUESTIONS CALL YOUR DOCTOR. ALSO,PLEASE READ THE INSTRUCTION PACKET FROM YOUR DR IF YOU RECEIVED ONE.   SPINE INTERVENTION NUMBER [id number]

## 2020-02-12 ENCOUNTER — APPOINTMENT (OUTPATIENT)
Dept: GENERAL RADIOLOGY | Age: 70
End: 2020-02-12
Attending: PHYSICAL MEDICINE & REHABILITATION
Payer: MEDICARE

## 2020-02-12 ENCOUNTER — HOSPITAL ENCOUNTER (OUTPATIENT)
Age: 70
Setting detail: OUTPATIENT SURGERY
Discharge: HOME | End: 2020-02-12
Attending: PHYSICAL MEDICINE & REHABILITATION | Admitting: PHYSICAL MEDICINE & REHABILITATION
Payer: MEDICARE

## 2020-02-12 VITALS
RESPIRATION RATE: 14 BRPM | TEMPERATURE: 97.6 F | HEART RATE: 51 BPM | OXYGEN SATURATION: 100 % | DIASTOLIC BLOOD PRESSURE: 87 MMHG | BODY MASS INDEX: 30.92 KG/M2 | HEIGHT: 68 IN | WEIGHT: 204 LBS | SYSTOLIC BLOOD PRESSURE: 125 MMHG

## 2020-02-12 PROCEDURE — 3209999900 FLUORO FOR SURGICAL PROCEDURES

## 2020-02-12 PROCEDURE — 2500000003 HC RX 250 WO HCPCS: Performed by: PHYSICAL MEDICINE & REHABILITATION

## 2020-02-12 PROCEDURE — 99152 MOD SED SAME PHYS/QHP 5/>YRS: CPT | Performed by: PHYSICAL MEDICINE & REHABILITATION

## 2020-02-12 PROCEDURE — 2709999900 HC NON-CHARGEABLE SUPPLY: Performed by: PHYSICAL MEDICINE & REHABILITATION

## 2020-02-12 PROCEDURE — 6360000002 HC RX W HCPCS: Performed by: PHYSICAL MEDICINE & REHABILITATION

## 2020-02-12 PROCEDURE — 3610000056 HC PAIN LEVEL 4 BASE (NON-OR): Performed by: PHYSICAL MEDICINE & REHABILITATION

## 2020-02-12 RX ORDER — LIDOCAINE HYDROCHLORIDE 10 MG/ML
INJECTION, SOLUTION INFILTRATION; PERINEURAL
Status: COMPLETED | OUTPATIENT
Start: 2020-02-12 | End: 2020-02-12

## 2020-02-12 RX ORDER — BUPIVACAINE HYDROCHLORIDE 5 MG/ML
INJECTION, SOLUTION EPIDURAL; INTRACAUDAL
Status: COMPLETED | OUTPATIENT
Start: 2020-02-12 | End: 2020-02-12

## 2020-02-12 RX ORDER — BETAMETHASONE SODIUM PHOSPHATE AND BETAMETHASONE ACETATE 3; 3 MG/ML; MG/ML
INJECTION, SUSPENSION INTRA-ARTICULAR; INTRALESIONAL; INTRAMUSCULAR; SOFT TISSUE
Status: COMPLETED | OUTPATIENT
Start: 2020-02-12 | End: 2020-02-12

## 2020-02-12 RX ORDER — MIDAZOLAM HYDROCHLORIDE 1 MG/ML
INJECTION INTRAMUSCULAR; INTRAVENOUS
Status: COMPLETED | OUTPATIENT
Start: 2020-02-12 | End: 2020-02-12

## 2020-02-12 ASSESSMENT — PAIN SCALES - GENERAL
PAINLEVEL_OUTOF10: 0
PAINLEVEL_OUTOF10: 0

## 2020-02-12 ASSESSMENT — PAIN - FUNCTIONAL ASSESSMENT: PAIN_FUNCTIONAL_ASSESSMENT: 0-10

## 2020-02-12 ASSESSMENT — PAIN DESCRIPTION - DESCRIPTORS: DESCRIPTORS: BURNING

## 2020-02-12 NOTE — H&P
HISTORY AND PHYSICAL/PRE-SEDATION ASSESSMENT    Patient:  Chela Carrillo   :  1950  Medical Record No.:  [id number]   Date:  2020  Physician:  Abhijit Hernandez M.D. Facility: [address] [place] [address]    HISTORY OF PRESENT ILLNESS:                 The patient is a 71 y.o. male whom presents with lower back and leg pain. Review of the imaging and physical exam of the patient confirmed the pre-procedure diagnosis. After a thorough discussion of risks, benefits and alternatives informed consent was obtained. Past Medical History:   Past Medical History:   Diagnosis Date    CAD (coronary artery disease)     Hyperlipidemia     Hypertension     Obstructive apnea     Swallowing difficulty     pt to f/u with an ENT    Thyroid disease       Past Surgical History:     Past Surgical History:   Procedure Laterality Date    CARDIAC CATHETERIZATION  2018    Dr. Winston Galvan - [facility], cor angio    COLONOSCOPY      CORONARY ANGIOPLASTY WITH STENT PLACEMENT  ,     4 placed at [address] [place] [address] in  (Dr. Michael Klein)   [facility]      quadruple    ELBOW SURGERY Right ,     KNEE ARTHROSCOPY Left     for meniscus tear    LUMBAR SPINE SURGERY Left 5/15/2019    LEFT L5, S1 TRANSFORAMINAL EPIDURAL STEROID INJECTION WITH FLUOROSCOPY performed by Abhijit Hernandez MD at [address] [place] [address] Bilateral 2019    BILATERAL L5 TRANSFORAMINAL EPIDURAL STEROID INJECTION WITH FLUOROSCOPY performed by Abhijit Hernandez MD at [address] [place] [address] Bilateral 2019    BILATERAL L5 TRANSFORAMINAL EPIDURAL STEROID INJECTION WITH FLUOROSOCPY performed by Abhijit Hernandez MD at [place] [address] Left 01/15/2013    Dr. Lily Faith, DPM - endoscopic fasciotomy     Current Medications:   Prior to Admission medications    Medication Sig Start Date End Date Taking?  Authorizing Provider   carvedilol (COREG) 3.125 MG tablet TAKE 1 Minimal                  []  General anesthesia    Patient's condition acceptable for planned procedure/sedation. Post Procedure Plan   Return to same level of care   ______________________     The risks and benefits as well as alternatives to the procedure have been discussed with the patient and or family. The patient and or next of kin understands and agrees to proceed.     Jorge Lake M.D.

## 2020-02-14 RX ORDER — CLOPIDOGREL BISULFATE 75 MG/1
TABLET ORAL
Qty: 90 TABLET | Refills: 3 | Status: SHIPPED | OUTPATIENT
Start: 2020-02-14 | End: 2021-02-17

## 2020-02-14 RX ORDER — ROSUVASTATIN CALCIUM 10 MG/1
TABLET, COATED ORAL
Qty: 90 TABLET | Refills: 1 | Status: SHIPPED | OUTPATIENT
Start: 2020-02-14 | End: 2020-08-14

## 2020-02-28 ENCOUNTER — OFFICE VISIT (OUTPATIENT)
Dept: ORTHOPEDIC SURGERY | Age: 70
End: 2020-02-28
Payer: MEDICARE

## 2020-02-28 ENCOUNTER — TELEPHONE (OUTPATIENT)
Dept: ORTHOPEDIC SURGERY | Age: 70
End: 2020-02-28

## 2020-02-28 VITALS
BODY MASS INDEX: 30.91 KG/M2 | HEART RATE: 61 BPM | WEIGHT: 203.93 LBS | SYSTOLIC BLOOD PRESSURE: 136 MMHG | HEIGHT: 68 IN | DIASTOLIC BLOOD PRESSURE: 85 MMHG

## 2020-02-28 PROCEDURE — G8417 CALC BMI ABV UP PARAM F/U: HCPCS | Performed by: PHYSICAL MEDICINE & REHABILITATION

## 2020-02-28 PROCEDURE — G8484 FLU IMMUNIZE NO ADMIN: HCPCS | Performed by: PHYSICAL MEDICINE & REHABILITATION

## 2020-02-28 PROCEDURE — 4040F PNEUMOC VAC/ADMIN/RCVD: CPT | Performed by: PHYSICAL MEDICINE & REHABILITATION

## 2020-02-28 PROCEDURE — G8427 DOCREV CUR MEDS BY ELIG CLIN: HCPCS | Performed by: PHYSICAL MEDICINE & REHABILITATION

## 2020-02-28 PROCEDURE — 1036F TOBACCO NON-USER: CPT | Performed by: PHYSICAL MEDICINE & REHABILITATION

## 2020-02-28 PROCEDURE — 99213 OFFICE O/P EST LOW 20 MIN: CPT | Performed by: PHYSICAL MEDICINE & REHABILITATION

## 2020-02-28 PROCEDURE — 1123F ACP DISCUSS/DSCN MKR DOCD: CPT | Performed by: PHYSICAL MEDICINE & REHABILITATION

## 2020-02-28 PROCEDURE — 3017F COLORECTAL CA SCREEN DOC REV: CPT | Performed by: PHYSICAL MEDICINE & REHABILITATION

## 2020-02-28 NOTE — TELEPHONE ENCOUNTER
Tried to call. L/m on v/m explaining surgeon's decision.  Patient may call after his knee surgery to schedule dea at least 2 weeks after tkr

## 2020-02-28 NOTE — TELEPHONE ENCOUNTER
Patient needs scheduled for an injection. x1 (B/L L4  TF)    Diabetes: NO  Glaucoma: NO  Blood Thinner: ASA / PLAVIX  Bleeding D/O: NO  Current infx/antibiotic: NO  MRSA/MSSA/VRE: NO    Patient is scheduled for TKA on 3/31/2020 - surgeon has been contacted regarding request for ARIA to be completed on 3/30/2020 and if this will interfere with their protocols or not. Per provider, patient to be scheduled for procedure on 3/30/2020 if Plavix and ASA hold are approved as well as clearance from surgeon.

## 2020-02-28 NOTE — PROGRESS NOTES
5/15/2019    LEFT L5, S1 TRANSFORAMINAL EPIDURAL STEROID INJECTION WITH FLUOROSCOPY performed by Kiara Martell MD at [address] [place] [address] Bilateral 8/26/2019    BILATERAL L5 TRANSFORAMINAL EPIDURAL STEROID INJECTION WITH FLUOROSCOPY performed by Kiara Martell MD at [address] [place] [address] Bilateral 12/23/2019    BILATERAL L5 TRANSFORAMINAL EPIDURAL STEROID INJECTION WITH FLUOROSOCPY performed by Kiara Martell MD at [address] [place] [address] Bilateral 2/12/2020    BILATERAL L4 TRANSFORMINAL EPIDURAL STEROID INJECTION WITH FLUOROSCOPY performed by Kiara Martell MD at [place] [address] Left 01/15/2013    Dr. Luc Diaz, DPM - endoscopic fasciotomy     Current Medications:     Current Outpatient Medications:     clopidogrel (PLAVIX) 75 MG tablet, TAKE 1 TABLET BY MOUTH EVERY DAY, Disp: 90 tablet, Rfl: 3    rosuvastatin (CRESTOR) 10 MG tablet, TAKE 1 TABLET BY MOUTH EVERY DAY, Disp: 90 tablet, Rfl: 1    carvedilol (COREG) 3.125 MG tablet, TAKE 1 TABLET BY MOUTH TWICE A DAY WITH MEALS, Disp: 180 tablet, Rfl: 3    losartan (COZAAR) 25 MG tablet, TAKE 1 TABLET BY MOUTH EVERY DAY, Disp: 90 tablet, Rfl: 3    rosuvastatin (CRESTOR) 10 MG tablet, Take 1 tablet by mouth daily, Disp: 90 tablet, Rfl: 3    levothyroxine (SYNTHROID) 137 MCG tablet, 175 mcg , Disp: , Rfl:     aspirin 81 MG chewable tablet, Take 81 mg by mouth daily, Disp: , Rfl:   Allergies:  Statins and Niacin and related  Social History:    reports that he has never smoked. He has never used smokeless tobacco. He reports that he does not drink alcohol or use drugs.   Family History:   Family History   Problem Relation Age of Onset    Heart Failure Mother     Liver Disease Mother     Alzheimer's Disease Mother        REVIEW OF SYSTEMS:   CONSTITUTIONAL: Denies unexplained weight loss, fevers, chills or fatigue  NEUROLOGICAL: Denies unsteady gait or progressive weakness  MUSCULOSKELETAL: Denies scheduled visit. He is specifically instructed to contact the office between now & his scheduled appointment if he has concerns related to his condition or if he needs assistance in scheduling the above tests. He is welcome to call for an appointment sooner if he has any additional concerns or questions. Ara Garcia. Lucrecia Null MD, MARIA ISABEL, [facility]  Board Certified in [address] [place] [address] Certified and Fellowship Trained in [facility] ([facility])             This dictation was performed with a verbal recognition program [facility]) and it was checked for errors. It is possible that there are still dictated errors within this office note. If so, please bring any errors to my attention for an addendum. All efforts were made to ensure that this office note is accurate.

## 2020-02-28 NOTE — TELEPHONE ENCOUNTER
Patient is potentially scheduled for 3/30/20 at 11:40 am at [facility].  Waiting for surgeon's approval

## 2020-02-28 NOTE — LETTER
Please schedule the following with:     Date:   20 @ 11:40   Account: [de-identified]  Patient: John Wilson    : 1950  Address:  [address] [place] [address]    Phone (H):  [phone] (home)      ----------------------------------------------------------------------------------------------  Diagnosis:     ICD-10-CM    1. Lumbar radiculopathy M54.16    2. DDD (degenerative disc disease), lumbar M51.36    3. Spondylosis of lumbar region without myelopathy or radiculopathy M47.816    4. Lumbar foraminal stenosis M48.061          Levels:Bilateral L4   Transforaminal ARIA  CPT Codes 25734  ----------------------------------------------------------------------------------------------  Injection # 2   [facility]    Attending Physician       Joe Daily MD.    ----------------------------------------------------------------------------------------------    [facility] [employer] [facility]    Pre-Cert#    [id number]  Pre-Cert#    Comments or Special instructions:    · Infection control  ? Tested positive for MRSA in past 12 months:  no  ? Tested positive for MSSA \"staph infection\" in past 12 months: no  ? Tested positive for VRE (Vancomycin Resistant Enterococci) in past 12 months:   no  ? Currently on any antibiotics for an infection: no  · Anticoagulants:  ? On a blood thinner:  ASA HOLD AND PLAVIX HOLD 7 DAYS DR Jessie Hebert [phone]   ?  Any history of bleeding disorder: no   · Advanced Liver disease: no   · Advanced Renal disease: no   · Glaucoma: no   · Diabetes: no      Sedation:         No  -----------------------------------------------------------------------------------------------  Allergies   Allergen Reactions    Statins Other (See Comments)     Atorvastatin & pravastatin: myalgia    Niacin And Related

## 2020-03-06 ENCOUNTER — OFFICE VISIT (OUTPATIENT)
Dept: CARDIOLOGY CLINIC | Age: 70
End: 2020-03-06
Payer: MEDICARE

## 2020-03-06 VITALS
WEIGHT: 211 LBS | DIASTOLIC BLOOD PRESSURE: 80 MMHG | HEART RATE: 59 BPM | SYSTOLIC BLOOD PRESSURE: 144 MMHG | HEIGHT: 67 IN | BODY MASS INDEX: 33.12 KG/M2

## 2020-03-06 PROCEDURE — 1036F TOBACCO NON-USER: CPT | Performed by: INTERNAL MEDICINE

## 2020-03-06 PROCEDURE — G8427 DOCREV CUR MEDS BY ELIG CLIN: HCPCS | Performed by: INTERNAL MEDICINE

## 2020-03-06 PROCEDURE — G8484 FLU IMMUNIZE NO ADMIN: HCPCS | Performed by: INTERNAL MEDICINE

## 2020-03-06 PROCEDURE — 1123F ACP DISCUSS/DSCN MKR DOCD: CPT | Performed by: INTERNAL MEDICINE

## 2020-03-06 PROCEDURE — G8417 CALC BMI ABV UP PARAM F/U: HCPCS | Performed by: INTERNAL MEDICINE

## 2020-03-06 PROCEDURE — 93000 ELECTROCARDIOGRAM COMPLETE: CPT | Performed by: INTERNAL MEDICINE

## 2020-03-06 PROCEDURE — 3017F COLORECTAL CA SCREEN DOC REV: CPT | Performed by: INTERNAL MEDICINE

## 2020-03-06 PROCEDURE — 99214 OFFICE O/P EST MOD 30 MIN: CPT | Performed by: INTERNAL MEDICINE

## 2020-03-06 PROCEDURE — 4040F PNEUMOC VAC/ADMIN/RCVD: CPT | Performed by: INTERNAL MEDICINE

## 2020-03-06 NOTE — PROGRESS NOTES
denies any chest pain, palpitations, RUIZ, dizziness, or edema. He continues to run a [employer], but is not as active recently due to orthopedic problems. Past Medical History:   has a past medical history of CAD (coronary artery disease), Hyperlipidemia, Hypertension, Obstructive apnea, Swallowing difficulty, and Thyroid disease. Surgical History:   has a past surgical history that includes Coronary angioplasty with stent (2005, 2015); Elbow surgery (Right, 2001, 2004); Knee arthroscopy (Left, 2000); Plantar fascia surgery (Left, 01/15/2013); Colonoscopy (2011); Cardiac catheterization (01/24/2018); Coronary artery bypass graft; Lumbar spine surgery (Left, 5/15/2019); Lumbar spine surgery (Bilateral, 8/26/2019); Pain management procedure (Bilateral, 12/23/2019); and Pain management procedure (Bilateral, 2/12/2020). 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 Social History Main Topics    Smoking status: Never Smoker     Smokeless tobacco: Never Used    Alcohol Use: No    Drug Use:     Sexually Active: Other Topics Concern    Not on file     Social History Narrative    No narrative on file       Family History:  family history includes Alzheimer's Disease in his mother; Heart Failure in his mother; Liver Disease in his mother. Allergies:  Statins and Niacin and related     Review of Systems:   · Constitutional: there has been no unanticipated weight loss. · Eyes: No visual changes   · ENT: No Headaches, hearing loss or vertigo. No mouth sores or sore throat. · Cardiovascular: Reviewed in HPI  · Respiratory: No cough or wheezing, no sputum production. · Gastrointestinal: No abdominal pain, appetite loss, blood in stools. No change in bowel or bladder habits.   · Genitourinary: No nocturia, dysuria, trouble voiding  · Musculoskeletal:  No gait disturbance, weakness or joint complaints. · Integumentary: No rash or pruritis. · Neurological: No headache, change in muscle strength, numbness or tingling. No change in gait, balance, coordination, mood, affect, memory, mentation, behavior. · Psychiatric: anxious   · Endocrine: No malaise or fever  · Hematologic/Lymphatic: No abnormal bruising or bleeding, blood clots or swollen lymph nodes. · Allergic/Immunologic: No nasal congestion or hives. Physical Examination:    Vitals:    03/06/20 1344 03/06/20 1356   BP: (!) 150/84 (!) 144/80   Site: Left Upper Arm Left Upper Arm   Position: Sitting Sitting   Cuff Size: Medium Adult Medium Adult   Pulse: 59    Weight: 211 lb (95.7 kg)    Height: 5' 7\" (1.702 m)      Body mass index is 33.05 kg/m². Wt Readings from Last 3 Encounters:   03/06/20 211 lb (95.7 kg)   02/28/20 203 lb 14.8 oz (92.5 kg)   02/12/20 204 lb (92.5 kg)      BP Readings from Last 3 Encounters:   03/06/20 (!) 144/80   02/28/20 136/85   02/12/20 125/87      Constitutional and General Appearance:  appears stated age  Respiratory:  · Normal excursion and expansion without use of accessory muscles  · Resp Auscultation: Normal breath sounds without dullness  Cardiovascular:  · The apical impulses not displaced  · Heart is regular rate and rhythm with normal S1, S2  · The carotid upstroke is normal, no bruit noted   · JVP is not elevated  · Peripheral pulses are symmetrical  · There is no clubbing, cyanosis of the extremities  · No edema  · Femoral Arteries: 2+ and equal  · Pedal Pulses: 2+ and equal   Abdomen:  · No masses or tenderness  · Normal bowel sounds  Neurological/Psychiatric:  · Alert and oriented x3  · Moves all extremities well  · Exhibits normal gait balance and coordination      Assessment/Plan  preop cardiac evaluation - EKG>sinus rhythm, old inferior MI  1.  Carotid stenosis: stable, doppler 7/12> <50% stenosis bilateral, repeat doppler 8/4/17: 0% bilateral   2. CAD (coronary artery disease): stable  CABG 1/29/18: CABG x 4 LIMA-LAD, SV-PDA, SV-Diag-OM2  Cath 1/24/18: LVEDP - 5. LVgram - mild inferior hypokinesis, EF 50%  Cors: LM - nl, LAD - proximal stents (2) with instent stenosis up to 95%, 70% between dx2 and dx3  DX1 - totalled with faint filling, DX2 moderate in caliber, RI - small with 90% ostial, LCX - 40% ostial, 50% between om1 and om2, mid stent and stent in OM2, 40% after mid stent, OM3 70%   RCA - dominant with 50% prox, long stented mid vessel with instent stenoses of 50-80%  Ff by 90% with another stent at RCA/PDA with instent restenosis of 95% Faint r-l collateral to the area of dx1  GXT 1/24/18: medium sized inferolateral fixed defect of moderate intensity consistent w/ infarction in territory of LCx and/or RCA. Small to medium sized anterior completely reversible defect of moderate intensity consistent with ischemia in territory of LAD. Small to medium sized inferolateral completely reversible defect of moderate intensity consistent with ischemia in Lcx and/or RCA. EF 47%. Referred to angiogram  Echo 4/28/17: EF 55%. There is inferior hypokinesis. Mild cLVH. Diastolic filling parameters suggests grade I diastolic dysfunction. Mild mitral annular calcification is present. Aortic valve appears sclerotic but opens adequately. trivial mitral, pulmonic and tricuspid regurgitation with RVSP 25 mmHg. Cath 7/10/15: LVEDP - 16, EF 40%  Cors: LM - nl. LAD - diffuse disease with long stented area in the prox and mid vessel with diffuse instent 40%, distal apical 90%. LCX - 40% ostial, 30% before LCX stent, 40% just prox to large PL branch, bifurcation stents in the LCX-OM3 junction which are patent.  OM1 small with 90% ostial  RCA - dom, diffusely diseased, 50%prox, long stented area with localized 60% stenosis, 50% between stents, 2nd stent prox to PDA is patent, PDA - prox 50%  FFR of RCA through all diseased segments was 0.85 ff 2 min of adenosine  Echo 6/2915 [facility]) [address], mildly reduced EF 50-55%, right ventricle mildly dilated, right atrium mildly dilated, left ventricular posterior hypokinesis  Cath 6/29/15 ([place]): right radial artery approach. Left main nl, LAD 30% proximal stent and apical 75%, Cx totally occluded. OM3 50%, RCA 30% in stent and distal 70%, PDA small to moderate with 80% proximal. No LV gram. Aspiration of Cx and OM with 2.5 x12 BERNIE in LCx, 2.75 x14 proximal to first stent in Cx, plaque shift to proximal OM with 2.75 x22 BERNIE to OM  stents x 4 at [facility] in 2005 (do not have records currently)  12/28/12 nuclear stress test> normal perfusion/EF, B/P elevated at rest & during peak exercise   3. Hypertension:   Stable   4. Hyperlipidemia: 10/3/19: ; TRIG 204; HDL 30; LDL 71, on statin, had aches on 80 mg Pravastatin. changed back to Crestor 10 mg, which he states he is tolerating   5. PVC's -palpitations, holter monitor 9/18: sinus rhythm, PVC's, PAC's. MCOT 11/27/2015 SB, no PVCs    PLAN:  Will evaluate cardiac status with an echo. No med changes. FU 6 months. He is probably stable for knee surgery. Scribe's attestation: This note was scribed in the presence of Dr Mary Gracia MD by Nika Castellano RN. The scribe's documentation has been prepared under my direction and personally reviewed by me in its entirety. I confirm that the note above accurately reflects all work, treatment, procedures, and medical decision making performed by me. Thank you for allowing to me to participate in the care of Kathy Hebert.

## 2020-03-06 NOTE — LETTER
between stents, 2nd stent prox to PDA is patent, PDA - prox 50%. He then had a plain stress test for rehab and participated in rehab. January, 2018 he was admitted to [facility] after a grossly abnormal gxt. Cath performed then referred for CABG. Dr Naveed Sawyer operated 1/29/18 w/ CABG x 4 LIMA-LAD, SV-PDA, SV-Diag-OM2. Today, Mr Garret Garcia states he will be having Rt knee replacement with Dr Lisa Parra 3/30/20. He denies any chest pain, palpitations, RUIZ, dizziness, or edema. He continues to run a [employer], but is not as active recently due to orthopedic problems. Past Medical History:   has a past medical history of CAD (coronary artery disease), Hyperlipidemia, Hypertension, Obstructive apnea, Swallowing difficulty, and Thyroid disease. Surgical History:   has a past surgical history that includes Coronary angioplasty with stent (2005, 2015); Elbow surgery (Right, 2001, 2004); Knee arthroscopy (Left, 2000); Plantar fascia surgery (Left, 01/15/2013); Colonoscopy (2011); Cardiac catheterization (01/24/2018); Coronary artery bypass graft; Lumbar spine surgery (Left, 5/15/2019); Lumbar spine surgery (Bilateral, 8/26/2019); Pain management procedure (Bilateral, 12/23/2019); and Pain management procedure (Bilateral, 2/12/2020). 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 Social History Main Topics    Smoking status: Never Smoker     Smokeless tobacco: Never Used    Alcohol Use: No    Drug Use:     Sexually Active: Other Topics Concern    Not on file     Social History Narrative    No narrative on file       Family History:  family history includes Alzheimer's Disease in his mother; Heart Failure in his mother; Liver Disease in his mother.      Allergies:  Statins and Niacin and related Cors: LM - nl. LAD - diffuse disease with long stented area in the prox and mid vessel with diffuse instent 40%, distal apical 90%. LCX - 40% ostial, 30% before LCX stent, 40% just prox to large PL branch, bifurcation stents in the LCX-OM3 junction which are patent. OM1 small with 90% ostial  RCA - dom, diffusely diseased, 50%prox, long stented area with localized 60% stenosis, 50% between stents, 2nd stent prox to PDA is patent, PDA - prox 50%  FFR of RCA through all diseased segments was 0.85 ff 2 min of adenosine  Echo 6/2915 [facility]) [address], mildly reduced EF 50-55%, right ventricle mildly dilated, right atrium mildly dilated, left ventricular posterior hypokinesis  Cath 6/29/15 ([place]): right radial artery approach. Left main nl, LAD 30% proximal stent and apical 75%, Cx totally occluded. OM3 50%, RCA 30% in stent and distal 70%, PDA small to moderate with 80% proximal. No LV gram. Aspiration of Cx and OM with 2.5 x12 BERNIE in LCx, 2.75 x14 proximal to first stent in Cx, plaque shift to proximal OM with 2.75 x22 BERNIE to OM  stents x 4 at [facility] in 2005 (do not have records currently)  12/28/12 nuclear stress test> normal perfusion/EF, B/P elevated at rest & during peak exercise   3. Hypertension:   Stable   4. Hyperlipidemia: 10/3/19: ; TRIG 204; HDL 30; LDL 71, on statin, had aches on 80 mg Pravastatin. changed back to Crestor 10 mg, which he states he is tolerating   5. PVC's -palpitations, holter monitor 9/18: sinus rhythm, PVC's, PAC's. MCOT 11/27/2015 SB, no PVCs    PLAN:  Will evaluate cardiac status with an echo. No med changes. FU 6 months. He is probably stable for knee surgery. Scribe's attestation: This note was scribed in the presence of Dr Macy Crawford MD by Lizbet Guerrero RN. The scribe's documentation has been prepared under my direction and personally reviewed by me in its entirety.  I confirm that the note above accurately reflects all work, treatment,

## 2020-03-13 ENCOUNTER — HOSPITAL ENCOUNTER (OUTPATIENT)
Dept: CARDIOLOGY | Age: 70
Discharge: HOME | End: 2020-03-13
Payer: MEDICARE

## 2020-03-13 LAB
LV EF: 53 %
LVEF MODALITY: NORMAL

## 2020-03-13 PROCEDURE — 93306 TTE W/DOPPLER COMPLETE: CPT

## 2020-03-19 ENCOUNTER — TELEPHONE (OUTPATIENT)
Dept: ORTHOPEDIC SURGERY | Age: 70
End: 2020-03-19

## 2020-05-08 ENCOUNTER — VIRTUAL VISIT (OUTPATIENT)
Dept: ORTHOPEDIC SURGERY | Age: 70
End: 2020-05-08
Payer: MEDICARE

## 2020-05-08 VITALS — HEIGHT: 67 IN | BODY MASS INDEX: 33.11 KG/M2 | WEIGHT: 210.98 LBS | RESPIRATION RATE: 12 BRPM

## 2020-05-08 PROCEDURE — 3017F COLORECTAL CA SCREEN DOC REV: CPT | Performed by: PHYSICIAN ASSISTANT

## 2020-05-08 PROCEDURE — G8427 DOCREV CUR MEDS BY ELIG CLIN: HCPCS | Performed by: PHYSICIAN ASSISTANT

## 2020-05-08 PROCEDURE — 4040F PNEUMOC VAC/ADMIN/RCVD: CPT | Performed by: PHYSICIAN ASSISTANT

## 2020-05-08 PROCEDURE — 99213 OFFICE O/P EST LOW 20 MIN: CPT | Performed by: PHYSICIAN ASSISTANT

## 2020-05-08 PROCEDURE — 1123F ACP DISCUSS/DSCN MKR DOCD: CPT | Performed by: PHYSICIAN ASSISTANT

## 2020-05-08 NOTE — PROGRESS NOTES
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 The patient was originally scheduled for a bilateral L4 transforaminal ARIA on 3/30/2020 which was canceled due to COVID-19 mandates. Injection Hx:   2/12/20: Bilateral L4 TF ARIA   12/23/19: Bilateral L5 TF ARIA   8/26/19: Bilateral L5 TF ARIA   5/15/19: Left L5 and S1 TF ARIA       Associated signs and symptoms:   Neurogenic bowel or bladder symptoms:  no   Perceived weakness:  no   Difficulty walking:  yes            Past medical, surgical, social and family history reviewed with the patient.      Past Medical History:   Past Medical History:   Diagnosis Date    CAD (coronary artery disease)     Hyperlipidemia     Hypertension     Medical history reviewed with no changes     Obstructive apnea     Swallowing difficulty     pt to f/u with an ENT    Thyroid disease       Past Surgical History:     Past Surgical History:   Procedure Laterality Date    CARDIAC CATHETERIZATION  01/24/2018    Dr. Almita King - [facility], cor angio    COLONOSCOPY  2011    CORONARY ANGIOPLASTY WITH STENT PLACEMENT  2005, 2015    4 placed at [address] [place] [address] in 2015 (Dr. Randall Holguin)   [facility]      quadruple    ELBOW SURGERY Right 2001, 2004    KNEE ARTHROSCOPY Left 2000    for meniscus tear    LUMBAR SPINE SURGERY Left 5/15/2019    LEFT L5, S1 TRANSFORAMINAL EPIDURAL STEROID INJECTION WITH FLUOROSCOPY performed by Jw Hill MD at [address] [place] [address] Bilateral 8/26/2019    BILATERAL L5 TRANSFORAMINAL EPIDURAL STEROID INJECTION WITH FLUOROSCOPY performed by Jw Hill MD at [address] [place] [address] Bilateral 12/23/2019    BILATERAL L5 TRANSFORAMINAL EPIDURAL STEROID INJECTION WITH FLUOROSOCPY performed by Jw Hill MD at [address] [place] [address] Bilateral 2/12/2020    BILATERAL L4 TRANSFORMINAL EPIDURAL STEROID INJECTION WITH FLUOROSCOPY performed by Gil Garner MD at [facility] Left 01/15/2013    Dr. Comfort Morales, DPM - endoscopic fasciotomy     Current Medications:     Current Outpatient Medications:     clopidogrel (PLAVIX) 75 MG tablet, TAKE 1 TABLET BY MOUTH EVERY DAY, Disp: 90 tablet, Rfl: 3    rosuvastatin (CRESTOR) 10 MG tablet, TAKE 1 TABLET BY MOUTH EVERY DAY, Disp: 90 tablet, Rfl: 1    carvedilol (COREG) 3.125 MG tablet, TAKE 1 TABLET BY MOUTH TWICE A DAY WITH MEALS, Disp: 180 tablet, Rfl: 3    losartan (COZAAR) 25 MG tablet, TAKE 1 TABLET BY MOUTH EVERY DAY, Disp: 90 tablet, Rfl: 3    levothyroxine (SYNTHROID) 175 MCG tablet, 175 mcg , Disp: , Rfl:     aspirin 81 MG chewable tablet, Take 81 mg by mouth daily, Disp: , Rfl:   Allergies:  Statins and Niacin and related  Social History:    reports that he has never smoked. He has never used smokeless tobacco. He reports that he does not drink alcohol or use drugs. Family History:   Family History   Problem Relation Age of Onset    Heart Failure Mother     Liver Disease Mother     Alzheimer's Disease Mother        REVIEW OF SYSTEMS:   CONSTITUTIONAL: Denies unexplained weight loss, fevers, chills or fatigue  NEUROLOGICAL: Denies unsteady gait or progressive weakness  MUSCULOSKELETAL: Denies joint swelling or redness  GI: Denies nausea, vomiting, diarrhea   : Denies bowel or bladder issues       PHYSICAL EXAM:  Limitations present due to Telehealth  Vitals: Resp. rate 12, height 5' 7.01\" (1.702 m), weight 210 lb 15.7 oz (95.7 kg). GENERAL EXAM:  · General Apparence: Patient is adequately groomed with no evidence of malnutrition. · Psychiatric: Orientation: The patient is oriented to time, place and person. The patient's mood and affect are appropriate   · Vascular: Examination reveals no swelling and palpation reveals no tenderness in upper or lower extremities. Good capillary refill.    · The lymphatic examination of the neck, axillae and groin reveals all areas to

## 2020-05-11 ENCOUNTER — OFFICE VISIT (OUTPATIENT)
Dept: PRIMARY CARE CLINIC | Age: 70
End: 2020-05-11

## 2020-05-13 LAB
SARS-COV-2: NOT DETECTED
SOURCE: NORMAL

## 2020-05-15 ENCOUNTER — APPOINTMENT (OUTPATIENT)
Dept: GENERAL RADIOLOGY | Age: 70
End: 2020-05-15
Attending: PHYSICAL MEDICINE & REHABILITATION
Payer: MEDICARE

## 2020-05-15 ENCOUNTER — HOSPITAL ENCOUNTER (OUTPATIENT)
Age: 70
Setting detail: OUTPATIENT SURGERY
Discharge: HOME | End: 2020-05-15
Attending: PHYSICAL MEDICINE & REHABILITATION | Admitting: PHYSICAL MEDICINE & REHABILITATION
Payer: MEDICARE

## 2020-05-15 VITALS
RESPIRATION RATE: 15 BRPM | HEART RATE: 49 BPM | DIASTOLIC BLOOD PRESSURE: 78 MMHG | WEIGHT: 204 LBS | BODY MASS INDEX: 30.92 KG/M2 | SYSTOLIC BLOOD PRESSURE: 138 MMHG | HEIGHT: 68 IN | OXYGEN SATURATION: 100 % | TEMPERATURE: 97.5 F

## 2020-05-15 PROCEDURE — 2709999900 HC NON-CHARGEABLE SUPPLY: Performed by: PHYSICAL MEDICINE & REHABILITATION

## 2020-05-15 PROCEDURE — 6360000002 HC RX W HCPCS: Performed by: PHYSICAL MEDICINE & REHABILITATION

## 2020-05-15 PROCEDURE — 99152 MOD SED SAME PHYS/QHP 5/>YRS: CPT | Performed by: PHYSICAL MEDICINE & REHABILITATION

## 2020-05-15 PROCEDURE — 3610000056 HC PAIN LEVEL 4 BASE (NON-OR): Performed by: PHYSICAL MEDICINE & REHABILITATION

## 2020-05-15 PROCEDURE — 3209999900 FLUORO FOR SURGICAL PROCEDURES

## 2020-05-15 PROCEDURE — 2500000003 HC RX 250 WO HCPCS: Performed by: PHYSICAL MEDICINE & REHABILITATION

## 2020-05-15 RX ORDER — BETAMETHASONE SODIUM PHOSPHATE AND BETAMETHASONE ACETATE 3; 3 MG/ML; MG/ML
INJECTION, SUSPENSION INTRA-ARTICULAR; INTRALESIONAL; INTRAMUSCULAR; SOFT TISSUE
Status: COMPLETED | OUTPATIENT
Start: 2020-05-15 | End: 2020-05-15

## 2020-05-15 RX ORDER — LIDOCAINE HYDROCHLORIDE 10 MG/ML
INJECTION, SOLUTION INFILTRATION; PERINEURAL
Status: COMPLETED | OUTPATIENT
Start: 2020-05-15 | End: 2020-05-15

## 2020-05-15 RX ORDER — BUPIVACAINE HYDROCHLORIDE 5 MG/ML
INJECTION, SOLUTION EPIDURAL; INTRACAUDAL
Status: COMPLETED | OUTPATIENT
Start: 2020-05-15 | End: 2020-05-15

## 2020-05-15 RX ORDER — MIDAZOLAM HYDROCHLORIDE 1 MG/ML
INJECTION INTRAMUSCULAR; INTRAVENOUS
Status: COMPLETED | OUTPATIENT
Start: 2020-05-15 | End: 2020-05-15

## 2020-05-15 RX ORDER — FENTANYL CITRATE 50 UG/ML
INJECTION, SOLUTION INTRAMUSCULAR; INTRAVENOUS
Status: COMPLETED | OUTPATIENT
Start: 2020-05-15 | End: 2020-05-15

## 2020-05-15 ASSESSMENT — PAIN SCALES - GENERAL
PAINLEVEL_OUTOF10: 3
PAINLEVEL_OUTOF10: 3

## 2020-05-15 ASSESSMENT — PAIN DESCRIPTION - DESCRIPTORS: DESCRIPTORS: BURNING

## 2020-05-15 ASSESSMENT — PAIN - FUNCTIONAL ASSESSMENT: PAIN_FUNCTIONAL_ASSESSMENT: 0-10

## 2020-05-29 ENCOUNTER — TELEPHONE (OUTPATIENT)
Dept: ORTHOPEDIC SURGERY | Age: 70
End: 2020-05-29

## 2020-05-29 ENCOUNTER — VIRTUAL VISIT (OUTPATIENT)
Dept: ORTHOPEDIC SURGERY | Age: 70
End: 2020-05-29
Payer: MEDICARE

## 2020-05-29 ENCOUNTER — TELEPHONE (OUTPATIENT)
Dept: CARDIOLOGY CLINIC | Age: 70
End: 2020-05-29

## 2020-05-29 VITALS — BODY MASS INDEX: 30.91 KG/M2 | RESPIRATION RATE: 12 BRPM | WEIGHT: 203.93 LBS | HEIGHT: 68 IN

## 2020-05-29 PROCEDURE — 3017F COLORECTAL CA SCREEN DOC REV: CPT | Performed by: PHYSICAL MEDICINE & REHABILITATION

## 2020-05-29 PROCEDURE — G8427 DOCREV CUR MEDS BY ELIG CLIN: HCPCS | Performed by: PHYSICAL MEDICINE & REHABILITATION

## 2020-05-29 PROCEDURE — 4040F PNEUMOC VAC/ADMIN/RCVD: CPT | Performed by: PHYSICAL MEDICINE & REHABILITATION

## 2020-05-29 PROCEDURE — 1123F ACP DISCUSS/DSCN MKR DOCD: CPT | Performed by: PHYSICAL MEDICINE & REHABILITATION

## 2020-05-29 PROCEDURE — 99214 OFFICE O/P EST MOD 30 MIN: CPT | Performed by: PHYSICAL MEDICINE & REHABILITATION

## 2020-05-29 RX ORDER — METHYLPREDNISOLONE 4 MG/1
TABLET ORAL
Qty: 1 KIT | Refills: 0 | Status: SHIPPED | OUTPATIENT
Start: 2020-05-29 | End: 2020-06-26 | Stop reason: ALTCHOICE

## 2020-05-29 NOTE — TELEPHONE ENCOUNTER
L/m for approval to hold PLAVIX for 7 days prior to [facility] on 6/10/20. Patient aware of hold date.

## 2020-05-29 NOTE — LETTER
Please schedule the following with:   Date:   06/10/20 @ 9:00   Account: [de-identified]  Patient: Kath Woodall    : 1950  Address:  [address] [place] [address]    Phone (H):  [phone] (home)      ----------------------------------------------------------------------------------------------  Diagnosis:     ICD-10-CM    1. Lumbar radiculopathy M54.16    2. DDD (degenerative disc disease), lumbar M51.36    3. Spondylosis of lumbar region without myelopathy or radiculopathy M47.816          Levels:L4/5 Interlaminar ARIA  CPT Codes 18825    ----------------------------------------------------------------------------------------------  Injection # 2   [facility]    Attending Physician       Susan Carpenter. Rina Olmos MD.      ----------------------------------------------------------------------------------------------  Injection Scheduled For:    At:    [address] [place] [address]    Pre-Cert#    [id number]  Pre-Cert#    Comments or Special instructions:  · Infection control  ? Tested positive for MRSA in past 12 months:  no  ? Tested positive for MSSA \"staph infection\" in past 12 months: no  ? Tested positive for VRE (Vancomycin Resistant Enterococci) in past 12 months:   no  ? Currently on any antibiotics for an infection: no  · Anticoagulants:  ? On a blood thinner:  ASA DO NOT  HOLD AND PLAVIX HOLD 7 DAYS DR Lucas Guy [phone]   ?  Any history of bleeding disorder: no   · Advanced Liver disease: no   · Advanced Renal disease: no   · Glaucoma: no   · Diabetes: no      Sedation:         No  -----------------------------------------------------------------------------------------------  Allergies   Allergen Reactions    Statins Other (See Comments)     Atorvastatin & pravastatin: myalgia    Niacin And Related

## 2020-05-29 NOTE — PROGRESS NOTES
tobacco. He reports that he does not drink alcohol or use drugs. Family History:   Family History   Problem Relation Age of Onset    Heart Failure Mother     Liver Disease Mother     Alzheimer's Disease Mother        REVIEW OF SYSTEMS:   CONSTITUTIONAL: Denies unexplained weight loss, fevers, chills or fatigue  NEUROLOGICAL: Denies unsteady gait or progressive weakness  MUSCULOSKELETAL: Denies joint swelling or redness  GI: Denies nausea, vomiting, diarrhea   : Denies bowel or bladder issues       PHYSICAL EXAM:  Limitations present due to Telehealth  Vitals: Resp. rate 12, height 5' 7.99\" (1.727 m), weight 203 lb 14.8 oz (92.5 kg). GENERAL EXAM:  · General Apparence: Patient is adequately groomed with no evidence of malnutrition. · Psychiatric: Orientation: The patient is oriented to time, place and person. The patient's mood and affect are appropriate   · Vascular: Examination reveals no swelling and palpation reveals no tenderness in upper or lower extremities. Sensation is intact without deficit in the upper and lower extremities to light touch   · Coordination of the upper and lower extremities are normal.    CERVICAL EXAMINATION:  · Inspection: Local inspection shows no step-off or bruising. Cervical alignment is normal.   · Palpation by patient: No evidence of tenderness at the midline. Paraspinal tenderness is not present. There is no paraspinal spasm. · Range of Motion:  pain-free ROM   · Strength: Strength testing is at least 4/5 bilateral upper extremities based on visual exam  · Skin:There are no rashes, ulcerations or lesions. · Gait & station:  normal, patient ambulates without assistance and no ataxia  · Additional Examinations:  · RIGHT UPPER EXTREMITY:  Inspection/examination of the right upper extremity does not show any tenderness, deformity or injury. Range of motion is normal and pain-free. There is no gross instability. There are no rashes, ulcerations or lesions.  Strength and 1. Lumbar radiculopathy    2. DDD (degenerative disc disease), lumbar    3. Spondylosis of lumbar region without myelopathy or radiculopathy        Plan:  Clinical Course: Above diagnoses are worsening    I discussed the diagnosis and the treatment options with Metro Sis today. In Summary:  The various treatment options were outlined and discussed with Rigoberto Lloyd including:  Conservative care options: physical therapy, ice, medications, bracing, and activity modification. The indications for therapeutic injections. The indications for additional imaging/laboratory studies. The indications for (possible future) interventions. After considering the various options discussed, Rigoberto Lloyd elected to pursue a course of treatment that includes the followin. Medications:  I will prescribe a medrol dose pack to help with the inflammatory component of pain. Risks, benefits and alternatives were discussed. Recommended to stop any NSAIDs to reduce risk of GI bleed during the course of the dose pack. 2. PT:  Encouraged to continue with Home exercise program.    3. Further studies: MR Lumbar spine to evaluate for continued pain      4. Interventional:  We discussed pursuing a L4/5 IL epidural steroid injection to address the pain. Radiologic imaging and symptoms confirm the pain etiology. Risks, benefits and alternatives of interventional options were discussed. These include and are not limited to bleeding, infection, spinal headache, nerve injury and lack of pain relief. The patient verbalized understanding and would like to proceed. The patient will be scheduled accordingly. 5. Follow up:  2-3 weeks      Rigoberto Lloyd was instructed to call the office if his symptoms worsen or if new symptoms appear prior to the next scheduled visit.  He is specifically instructed to contact the office between now & his scheduled appointment if he has concerns related to his condition or if he

## 2020-06-03 NOTE — PROGRESS NOTES
PATIENT REACHED   YES_X___NO____    PREOP INSTUCTIONS Patient instructed to get their COVID-19 test done as directed by their doctor (5-7 days prior to procedure)  or patient states will get on ___6/4_______. Instructed to self quarantine after test until DOS. There is a no visitor policy at [facility]. The patients ride is expected to remain in the car with a cell phone for communication. If the ride is leaving the hospital grounds please make sure they are back in time for pickup. Have the patient inform the staff on arrival what their rides plans are while the patient is in the facility. DATE__6/10/20_______ TIME__0900_______ARRIVAL__0800______PLACE__masc__________  NOTHING TO EAT OR DRINK  AFTER MIDNIGHT THE EVENING PRIOR OR AS INSTRUCTED BY YOUR DR.  Lori Chavarria NEED A RESPONSIBLE ADULT AGE 18 OR OLDER TO DRIVE YOU HOME  PLEASE BRING INSURANCE CARD. PICTURE ID AND COMPLETE LIST OF MEDS  WEAR LOOSE COMFORTABLE CLOTHING  FOLLOW ANY INSTRUCTIONS YOUR DRS OFFICE HAS GIVEN YOU,INCLUDING WHAT MEDICATIONS TO TAKE THE AM OF PROCEDURE AND WHEN AND IF YOU NEED TO STOP ANY BLOOD THINNERS. IF YOU HAVE QUESTIONS REGARDING THIS CALL THE OFFICE  THE GOAL BLOOD SUGAR THE AM OF PROCEDURE  OR LESS ABOVE THAT THE PROCEDURE MAY BE CANCELLED  ANY QUESTIONS CALL YOUR DOCTOR. ALSO,PLEASE READ THE INSTRUCTION PACKET FROM YOUR DR IF YOU RECEIVED ONE.   SPINE INTERVENTION NUMBER [id number]

## 2020-06-04 ENCOUNTER — OFFICE VISIT (OUTPATIENT)
Dept: PRIMARY CARE CLINIC | Age: 70
End: 2020-06-04

## 2020-06-06 LAB
SARS-COV-2: NOT DETECTED
SOURCE: NORMAL

## 2020-06-08 ENCOUNTER — OFFICE VISIT (OUTPATIENT)
Dept: PRIMARY CARE CLINIC | Age: 70
End: 2020-06-08

## 2020-06-08 ENCOUNTER — TELEPHONE (OUTPATIENT)
Dept: ORTHOPEDIC SURGERY | Age: 70
End: 2020-06-08

## 2020-06-08 NOTE — PATIENT INSTRUCTIONS
Steps to help prevent the spread of COVID-19 if you are sick  SOURCE - https://villegas-cooley.info/. html     Stay home except to get medical care   ; Stay home: People who are mildly ill with COVID-19 are able to isolate at home during their illness. You should restrict activities outside your home, except for getting medical care.   ; Avoid public areas: Do not go to work, school, or public areas.   ; Avoid public transportation: Avoid using public transportation, ride-sharing, or taxis.  ; Separate yourself from other people and animals in your home   ; Stay away from others: As much as possible, you should stay in a specific room and away from other people in your home. Also, you should use a separate bathroom, if available.   ; Limit contact with pets & animals: You should restrict contact with pets and other animals while you are sick with COVID-19, just like you would around other people. Although there have not been reports of pets or other animals becoming sick with COVID-19, it is still recommended that people sick with COVID-19 limit contact with animals until more information is known about the virus. ; When possible, have another member of your household care for your animals while you are sick. If you are sick with COVID-19, avoid contact with your pet, including petting, snuggling, being kissed or licked, and sharing food. If you must care for your pet or be around animals while you are sick, wash your hands before and after you interact with pets and wear a facemask. See COVID-19 and Animals for more information. Other considerations   The ill person should eat/be fed in their room if possible. Non-disposable  items used should be handled with gloves and washed with hot water or in a . Clean hands after handling used  items.  If possible, dedicate a lined trash can for the ill person.  Use gloves when removing garbage bags, handling, and disposing of trash. Wash hands after handling or disposing of trash.  Consider consulting with your local health department about trash disposal guidance if available. Information for Household Members and Caregivers of Someone who is Sick   Call ahead before visiting your doctor   Call ahead: If you have a medical appointment, call the healthcare provider and tell them that you have or may have COVID-19. This will help the healthcare provider's office take steps to keep other people from getting infected or exposed. Wear a facemask if you are sick   ; If you are sick: You should wear a facemask when you are around other people (e.g., sharing a room or vehicle) or pets and before you enter a healthcare provider's office. ; If you are caring for others: If the person who is sick is not able to wear a facemask (for example, because it causes trouble breathing), then people who live with the person who is sick should not stay in the same room with them, or they should wear a facemask if they enter a room with the person who is sick. Cover your coughs and sneezes   ; Cover: Cover your mouth and nose with a tissue when you cough or sneeze.   ; Dispose: Throw used tissues in a lined trash can.   ; Wash hands: Immediately wash your hands with soap and water for at least 20 seconds or, if soap and water are not available, clean your hands with an alcohol-based hand  that contains at least 60% alcohol. Clean your hands often   ;  Wash hands: Wash your hands often with soap and water for at least 20 seconds, especially after blowing your nose, coughing, or sneezing; going to the bathroom; and before eating or preparing food.   ; Hand : If soap and water are not readily available, use an alcohol-based hand  with at least 60% alcohol, covering all surfaces of your hands and rubbing them together until they feel dry.   ; Soap and water: Soap and water are the best option if hands are visibly dirty.   ; Avoid touching: Avoid touching your eyes, nose, and mouth with unwashed hands. Handwashing Tips   ; Wet your hands with clean, running water (warm or cold), turn off the tap, and apply soap.  ; Lather your hands by rubbing them together with the soap. Lather the backs of your hands, between your fingers, and under your nails. ; Scrub your hands for at least 20 seconds. Need a timer? Hum the [place] from beginning to end twice.  ; Rinse your hands well under clean, running water.  ; Dry your hands using a clean towel or air dry them. Avoid sharing personal household items   ; Do not share: You should not share dishes, drinking glasses, cups, eating utensils, towels, or bedding with other people or pets in your home.   ; Wash thoroughly after use: After using these items, they should be washed thoroughly with soap and water. Clean all high-touch surfaces everyday   ; Clean and disinfect: Practice routine cleaning of high touch surfaces.  ; High touch surfaces include counters, tabletops, doorknobs, bathroom fixtures, toilets, phones, keyboards, tablets, and bedside tables.  ; Disinfect areas with bodily fluids: Also, clean any surfaces that may have blood, stool, or body fluids on them.   ; Household : Use a household cleaning spray or wipe, according to the label instructions. Labels contain instructions for safe and effective use of the cleaning product including precautions you should take when applying the product, such as wearing gloves and making sure you have good ventilation during use of the product.     Monitor your symptoms   Seek medical attention: Seek prompt medical attention if your illness is worsening     (e.g., difficulty breathing).   ; Call your doctor: Before seeking care, call your healthcare provider and tell them that you have, or are being evaluated for, COVID-19.   ; Wear a facemask when sick: Put on a facemask before you enter the facility. These steps will help the healthcare provider's office to keep other people in the office or waiting room from getting infected or exposed. ; Alert health department: Ask your healthcare provider to call the local or state health department. Persons who are placed under active monitoring or facilitated self-monitoring should follow instructions provided by their local health department or occupational health professionals, as appropriate.  ; Call 911 if you have a medical emergency: If you have a medical emergency and need to call 911, notify the dispatch personnel that you have, or are being evaluated for COVID-19. If possible, put on a facemask before emergency medical services arrive. Thank you for enrolling in [facility]. Please follow the instructions below to securely access your online medical record. Runteq allows you to send messages to your doctor, view your test results, renew your prescriptions, schedule appointments, and more. How Do I Sign Up? 1. In your Internet browser, go to [email or web address]  2. Click on the Sign Up Now link in the Sign In box. You will see the New Member Sign Up page. 3. Enter your Cubiet Access Code exactly as it appears below. You will not need to use this code after youve completed the sign-up process. If you do not sign up before the expiration date, you must request a new code. MyChart Access Code: Activation code not generated  Current Runteq Status: Patient Declined    4. Enter your Social Security Number ([id number]) and Date of Birth (mm/dd/yyyy) as indicated and click Submit. You will be taken to the next sign-up page. 5. Create a Cubiet ID. This will be your Runteq login ID and cannot be changed, so think of one that is secure and easy to remember. 6. Create a Runteq password. You can change your password at any time. 7. Enter your Password Reset Question and Answer.  This can be used at a later time if you

## 2020-06-09 ENCOUNTER — TELEPHONE (OUTPATIENT)
Dept: ORTHOPEDIC SURGERY | Age: 70
End: 2020-06-09

## 2020-06-09 NOTE — TELEPHONE ENCOUNTER
DOS   06/10/2020  CPT   56529   63051.26  12803   DX   M54.16  M51.36  m47.816  OP SX AUTH  NPR    LEVELS   L4 - L5   PROCEDURE   Interlaminar ARIA    [address] [place] [address] Rd:   Medicare

## 2020-06-10 ENCOUNTER — APPOINTMENT (OUTPATIENT)
Dept: GENERAL RADIOLOGY | Age: 70
End: 2020-06-10
Attending: PHYSICAL MEDICINE & REHABILITATION
Payer: MEDICARE

## 2020-06-10 ENCOUNTER — HOSPITAL ENCOUNTER (OUTPATIENT)
Age: 70
Setting detail: OUTPATIENT SURGERY
Discharge: HOME | End: 2020-06-10
Attending: PHYSICAL MEDICINE & REHABILITATION | Admitting: PHYSICAL MEDICINE & REHABILITATION
Payer: MEDICARE

## 2020-06-10 VITALS
BODY MASS INDEX: 31.07 KG/M2 | RESPIRATION RATE: 18 BRPM | SYSTOLIC BLOOD PRESSURE: 134 MMHG | WEIGHT: 205 LBS | HEIGHT: 68 IN | DIASTOLIC BLOOD PRESSURE: 94 MMHG | HEART RATE: 63 BPM | TEMPERATURE: 97.6 F | OXYGEN SATURATION: 99 %

## 2020-06-10 PROCEDURE — 99152 MOD SED SAME PHYS/QHP 5/>YRS: CPT | Performed by: PHYSICAL MEDICINE & REHABILITATION

## 2020-06-10 PROCEDURE — 2709999900 HC NON-CHARGEABLE SUPPLY: Performed by: PHYSICAL MEDICINE & REHABILITATION

## 2020-06-10 PROCEDURE — 77003 FLUOROGUIDE FOR SPINE INJECT: CPT

## 2020-06-10 PROCEDURE — 6360000002 HC RX W HCPCS: Performed by: PHYSICAL MEDICINE & REHABILITATION

## 2020-06-10 PROCEDURE — 3610000054 HC PAIN LEVEL 3 BASE (NON-OR): Performed by: PHYSICAL MEDICINE & REHABILITATION

## 2020-06-10 PROCEDURE — 2500000003 HC RX 250 WO HCPCS: Performed by: PHYSICAL MEDICINE & REHABILITATION

## 2020-06-10 PROCEDURE — 2580000003 HC RX 258: Performed by: PHYSICAL MEDICINE & REHABILITATION

## 2020-06-10 RX ORDER — METHYLPREDNISOLONE ACETATE 80 MG/ML
INJECTION, SUSPENSION INTRA-ARTICULAR; INTRALESIONAL; INTRAMUSCULAR; SOFT TISSUE
Status: COMPLETED | OUTPATIENT
Start: 2020-06-10 | End: 2020-06-10

## 2020-06-10 RX ORDER — FENTANYL CITRATE 50 UG/ML
INJECTION, SOLUTION INTRAMUSCULAR; INTRAVENOUS
Status: COMPLETED | OUTPATIENT
Start: 2020-06-10 | End: 2020-06-10

## 2020-06-10 RX ORDER — SODIUM CHLORIDE 9 MG/ML
INJECTION INTRAVENOUS
Status: COMPLETED | OUTPATIENT
Start: 2020-06-10 | End: 2020-06-10

## 2020-06-10 RX ORDER — LIDOCAINE HYDROCHLORIDE 10 MG/ML
INJECTION, SOLUTION INFILTRATION; PERINEURAL
Status: COMPLETED | OUTPATIENT
Start: 2020-06-10 | End: 2020-06-10

## 2020-06-10 RX ORDER — MIDAZOLAM HYDROCHLORIDE 1 MG/ML
INJECTION INTRAMUSCULAR; INTRAVENOUS
Status: COMPLETED | OUTPATIENT
Start: 2020-06-10 | End: 2020-06-10

## 2020-06-10 ASSESSMENT — PAIN - FUNCTIONAL ASSESSMENT: PAIN_FUNCTIONAL_ASSESSMENT: 0-10

## 2020-06-10 ASSESSMENT — PAIN DESCRIPTION - DESCRIPTORS: DESCRIPTORS: BURNING;SHARP

## 2020-06-10 ASSESSMENT — PAIN SCALES - GENERAL
PAINLEVEL_OUTOF10: 0
PAINLEVEL_OUTOF10: 0

## 2020-06-10 NOTE — PROGRESS NOTES
Patient heart rate between 39 - 63. Patient placed on monitor & showing SR with PAC's. Advised patient to follow up with cardiologist due to low heart rate, & patient stated he would call cardiologist tomorrow.

## 2020-06-10 NOTE — H&P
HISTORY AND PHYSICAL/PRE-SEDATION ASSESSMENT    Patient:  Elsy Clayton   :  1950  Medical Record No.:  [id number]   Date:  6/10/2020  Physician:  Onelia Little M.D. Facility: [address] [place] [address]    HISTORY OF PRESENT ILLNESS:                 The patient is a 79 y.o. male whom presents with lower back and leg pain. Review of the imaging and physical exam of the patient confirmed the pre-procedure diagnosis. After a thorough discussion of risks, benefits and alternatives informed consent was obtained.                 Past Medical History:   Past Medical History:   Diagnosis Date    CAD (coronary artery disease)     Hyperlipidemia     Hypertension     Medical history reviewed with no changes     Medical history reviewed with no changes     Obstructive apnea     Swallowing difficulty     pt to f/u with an ENT    Thyroid disease       Past Surgical History:     Past Surgical History:   Procedure Laterality Date    CARDIAC CATHETERIZATION  2018    Dr. Ling Obrien - [facility], cor angio    COLONOSCOPY      CORONARY ANGIOPLASTY WITH STENT PLACEMENT  ,     4 placed at [address] [place] [address] in  (Dr. Cally Quinones)   [facility]      quadruple    ELBOW SURGERY Right ,     KNEE ARTHROSCOPY Left     for meniscus tear    LUMBAR SPINE SURGERY Left 5/15/2019    LEFT L5, S1 TRANSFORAMINAL EPIDURAL STEROID INJECTION WITH FLUOROSCOPY performed by Onelia Little MD at [address] [place] [address] Bilateral 2019    BILATERAL L5 TRANSFORAMINAL EPIDURAL STEROID INJECTION WITH FLUOROSCOPY performed by Onelia Little MD at [address] [place] [address] Bilateral 2019    BILATERAL L5 TRANSFORAMINAL EPIDURAL STEROID INJECTION WITH FLUOROSOCPY performed by Onelia Little MD at [address] [place] [address] Bilateral 2020    BILATERAL L4 TRANSFORMINAL EPIDURAL STEROID INJECTION WITH FLUOROSCOPY performed by Onelia Little MD at [facility] Painful range of motion, no midline tenderness       Diagnosis:Lumbar radiculopathy  M54.16  M51.36  M47.816    Plan: Proceed with planned procedure      ASA CLASS:         []   I. Normal, healthy adult           [x]   II.  Mild systemic disease            []   III. Severe systemic disease      Mallampati: Mallampati Class II - (soft palate, fauces & uvula are visible)      Sedation plan:   [x]  Local              []  Minimal                  []  General anesthesia    Patient's condition acceptable for planned procedure/sedation. Post Procedure Plan   Return to same level of care   ______________________     The patient was counseled at length about the risks of naomi Covid-19 in the kosta-operative and post-operative states including the recovery window of their procedure. The patient was made aware that naomi Covid-19 after a surgical procedure may worsen their prognosis for recovering from the virus and lend to a higher morbidity and or mortality risk. The patient was given the options of postponing their procedure. All of the risks, benefits, and alternatives were discussed. The patient does wish to proceed with the procedure. The risks and benefits as well as alternatives to the procedure have been discussed with the patient and or family. The patient and or next of kin understands and agrees to proceed.     Yani Ayala M.D.

## 2020-06-11 LAB
SARS-COV-2: NOT DETECTED
SOURCE: NORMAL

## 2020-06-26 ENCOUNTER — TELEPHONE (OUTPATIENT)
Dept: CARDIOLOGY CLINIC | Age: 70
End: 2020-06-26

## 2020-06-26 ENCOUNTER — OFFICE VISIT (OUTPATIENT)
Dept: ORTHOPEDIC SURGERY | Age: 70
End: 2020-06-26
Payer: MEDICARE

## 2020-06-26 VITALS — TEMPERATURE: 98.2 F | HEIGHT: 68 IN | BODY MASS INDEX: 31.07 KG/M2 | WEIGHT: 205.03 LBS | RESPIRATION RATE: 14 BRPM

## 2020-06-26 PROCEDURE — 4040F PNEUMOC VAC/ADMIN/RCVD: CPT | Performed by: PHYSICIAN ASSISTANT

## 2020-06-26 PROCEDURE — G8417 CALC BMI ABV UP PARAM F/U: HCPCS | Performed by: PHYSICIAN ASSISTANT

## 2020-06-26 PROCEDURE — 1123F ACP DISCUSS/DSCN MKR DOCD: CPT | Performed by: PHYSICIAN ASSISTANT

## 2020-06-26 PROCEDURE — 1036F TOBACCO NON-USER: CPT | Performed by: PHYSICIAN ASSISTANT

## 2020-06-26 PROCEDURE — G8427 DOCREV CUR MEDS BY ELIG CLIN: HCPCS | Performed by: PHYSICIAN ASSISTANT

## 2020-06-26 PROCEDURE — 99213 OFFICE O/P EST LOW 20 MIN: CPT | Performed by: PHYSICIAN ASSISTANT

## 2020-06-26 PROCEDURE — 3017F COLORECTAL CA SCREEN DOC REV: CPT | Performed by: PHYSICIAN ASSISTANT

## 2020-06-26 NOTE — PROGRESS NOTES
[facility] in 2015 (Dr. Miguel Angel Cheung)   [facility]      quadruple    ELBOW SURGERY Right 2001, 2004    KNEE ARTHROSCOPY Left 2000    for meniscus tear    LUMBAR SPINE SURGERY Left 5/15/2019    LEFT L5, S1 TRANSFORAMINAL EPIDURAL STEROID INJECTION WITH FLUOROSCOPY performed by Jamaica Santos MD at [address] [place] [address] Street Bilateral 8/26/2019    BILATERAL L5 TRANSFORAMINAL EPIDURAL STEROID INJECTION WITH FLUOROSCOPY performed by Jamaica Santos MD at [address] [place] [address] Bilateral 12/23/2019    BILATERAL L5 TRANSFORAMINAL EPIDURAL STEROID INJECTION WITH FLUOROSOCPY performed by Jamaica Santos MD at [address] [place] [address] Bilateral 2/12/2020    BILATERAL L4 TRANSFORMINAL EPIDURAL STEROID INJECTION WITH FLUOROSCOPY performed by Jamaica Santos MD at [address] [place] [address] Bilateral 5/15/2020    BILATERAL L4 TRANSFORAMINAL EPIDURAL STEROID INJECTION WITH FLUOROSCOPY performed by Jamaica Santos MD at [address] [place] [address] N/A 6/10/2020    L4L5 INTERLAMINAR EPIDURAL STEROID INJECTION WITH FLUOROSCOPY performed by Jamaica Santos MD at [place] [address] Left 01/15/2013    Dr. Svetlana Ugarte, DPM - endoscopic fasciotomy     Current Medications:     Current Outpatient Medications:     clopidogrel (PLAVIX) 75 MG tablet, TAKE 1 TABLET BY MOUTH EVERY DAY, Disp: 90 tablet, Rfl: 3    rosuvastatin (CRESTOR) 10 MG tablet, TAKE 1 TABLET BY MOUTH EVERY DAY, Disp: 90 tablet, Rfl: 1    carvedilol (COREG) 3.125 MG tablet, TAKE 1 TABLET BY MOUTH TWICE A DAY WITH MEALS, Disp: 180 tablet, Rfl: 3    losartan (COZAAR) 25 MG tablet, TAKE 1 TABLET BY MOUTH EVERY DAY, Disp: 90 tablet, Rfl: 3    levothyroxine (SYNTHROID) 175 MCG tablet, 175 mcg , Disp: , Rfl:     aspirin 81 MG chewable tablet, Take 81 mg by mouth daily, Disp: , Rfl:   Allergies:  Latex; Statins; and Niacin and related  Social History:    reports that he has never smoked. He has never used smokeless tobacco. He reports that he does not drink alcohol or use drugs. Family History:   Family History   Problem Relation Age of Onset    Heart Failure Mother     Liver Disease Mother     Alzheimer's Disease Mother        REVIEW OF SYSTEMS:   CONSTITUTIONAL: Denies unexplained weight loss, fevers, chills or fatigue  NEUROLOGICAL: Denies unsteady gait or progressive weakness  MUSCULOSKELETAL: Denies joint swelling or redness  GI: Denies nausea, vomiting, diarrhea   : Denies bowel or bladder issues       PHYSICAL EXAM:    Vitals: Temperature 98.2 °F (36.8 °C), resp. rate 14, height 5' 7.99\" (1.727 m), weight 205 lb 0.4 oz (93 kg). GENERAL EXAM:  · General Apparence: Patient is adequately groomed with no evidence of malnutrition. · Psychiatric: Orientation: The patient is oriented to time, place and person. The patient's mood and affect are appropriate   · Vascular: Examination reveals no swelling and palpation reveals no tenderness in upper or lower extremities, except in the bilateral feet with 1+ pitting edema with no tenderness to palpation. Dorsalis pedis pulses normal.  Good capillary refill. · The lymphatic examination of the neck, axillae and groin reveals all areas to be without enlargement or induration  · Sensation is intact without deficit in the upper and lower extremities to light touch and pinprick  · Coordination of the upper and lower extremities are normal.  · RIGHT UPPER EXTREMITY:  Inspection/examination of the right upper extremity does not show any tenderness, deformity or injury. Range of motion is unremarkable and pain-free. There is no gross instability. There are no rashes, ulcerations or lesions. Strength and tone are normal. No atrophy or abnormal movements are noted. · LEFT UPPER EXTREMITY: Inspection/examination of the left upper extremity does not show any tenderness, deformity or injury. Range of motion is unremarkable and pain-free.  There is

## 2020-07-13 ENCOUNTER — NURSE ONLY (OUTPATIENT)
Dept: CARDIOLOGY CLINIC | Age: 70
End: 2020-07-13

## 2020-07-13 NOTE — PROGRESS NOTES
TRK Monday 21st at [facility]. EKG done for Dr Wilfred Bentley to review.

## 2020-07-14 ENCOUNTER — TELEPHONE (OUTPATIENT)
Dept: CARDIOLOGY CLINIC | Age: 70
End: 2020-07-14

## 2020-07-14 ENCOUNTER — HOSPITAL ENCOUNTER (OUTPATIENT)
Dept: MRI IMAGING | Age: 70
Discharge: HOME | End: 2020-07-14
Payer: MEDICARE

## 2020-07-14 PROCEDURE — 72148 MRI LUMBAR SPINE W/O DYE: CPT

## 2020-08-13 ENCOUNTER — TELEPHONE (OUTPATIENT)
Dept: ORTHOPEDIC SURGERY | Age: 70
End: 2020-08-13

## 2020-08-13 NOTE — TELEPHONE ENCOUNTER
Patient calls the office today requesting to schedule repeat [place] ARIA. Patient last seen by ADIA on 6/26/20. Please advise further instruction for this patient  1. Medications:  No further recommendations for new medications.     2. PT:  Encouraged to continue with Home exercise program.     3. Further studies: Setup Lumbar MR without contrast to evaluate for soft tissue pathology or stenosis contributing to the back pain and paresthesia. The patient has failed a six week trial of a HEP program within the last 3 months.       4. Interventional:  The patient will be referred to Dr. Dusty Jordan with [facility] for a surgical evaluation for the lower back and left greater than right leg pain.      5.  Follow up:  4-6 weeks

## 2020-08-13 NOTE — TELEPHONE ENCOUNTER
THE PATIENT HAD KNEE REPLACEMENT(R) 3 WEEKS AGO AND IS WANTING TO GET ANOTHER INJECTION SO THAT HE CAN DO HIS PT.hE SAID SOMETHING ABOUT A PINCHED NERVE

## 2020-08-14 ENCOUNTER — TELEPHONE (OUTPATIENT)
Dept: CARDIOLOGY CLINIC | Age: 70
End: 2020-08-14

## 2020-08-14 RX ORDER — ROSUVASTATIN CALCIUM 10 MG/1
TABLET, COATED ORAL
Qty: 90 TABLET | Refills: 3 | Status: SHIPPED | OUTPATIENT
Start: 2020-08-14 | End: 2021-09-21 | Stop reason: SDUPTHER

## 2020-08-14 RX ORDER — LOSARTAN POTASSIUM 25 MG/1
TABLET ORAL
Qty: 90 TABLET | Refills: 3 | Status: SHIPPED | OUTPATIENT
Start: 2020-08-14 | End: 2021-06-09
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
## 2020-08-18 ENCOUNTER — TELEPHONE (OUTPATIENT)
Dept: ORTHOPEDIC SURGERY | Age: 70
End: 2020-08-18

## 2020-08-18 NOTE — TELEPHONE ENCOUNTER
L/M FOR PATIENT returned call, advised more information will be needed in order to handle his request.

## 2020-08-19 ENCOUNTER — TELEPHONE (OUTPATIENT)
Dept: ORTHOPEDIC SURGERY | Age: 70
End: 2020-08-19

## 2020-08-19 NOTE — TELEPHONE ENCOUNTER
S/w patient. He states having knee replacement 4 weeks ago and is having a lot of burning and swelling in his leg. He notes reduce range of motion as well. He has not contacted knee surgeon's office in regards to issues because he is very adamant that his symptoms are related to his ongoing back issues and not his surgery. He was asking for pain medication to help aid in physical therapy for knee. Patient was offered an OV today with [facility] but he declined, stating he had other appointments today he could not miss. He has been scheduled for an appointment tomorrow with [facility]. All questions answered.

## 2020-08-20 ENCOUNTER — OFFICE VISIT (OUTPATIENT)
Dept: ORTHOPEDIC SURGERY | Age: 70
End: 2020-08-20
Payer: MEDICARE

## 2020-08-20 VITALS — WEIGHT: 205 LBS | TEMPERATURE: 98.9 F | BODY MASS INDEX: 31.07 KG/M2 | HEIGHT: 68 IN | RESPIRATION RATE: 12 BRPM

## 2020-08-20 PROCEDURE — 99213 OFFICE O/P EST LOW 20 MIN: CPT | Performed by: STUDENT IN AN ORGANIZED HEALTH CARE EDUCATION/TRAINING PROGRAM

## 2020-08-20 RX ORDER — GABAPENTIN 100 MG/1
100 CAPSULE ORAL NIGHTLY
Qty: 30 CAPSULE | Refills: 0 | Status: SHIPPED | OUTPATIENT
Start: 2020-08-20 | End: 2021-03-08

## 2020-08-20 NOTE — PROGRESS NOTES
Follow up: Jeronimo Slaughter  1950  [id number]      CHIEF COMPLAINT:    Chief Complaint   Patient presents with    Lower Back Pain     F/u LSP         HISTORY OF PRESENT ILLNESS:  Mr. Kathleen Núñez is a 79 y.o. male returns for a follow up visit for multiple medical problems. His current presenting problems are   1. Lumbar radiculopathy    2. DDD (degenerative disc disease), lumbar    3. Spondylosis of lumbar region without myelopathy or radiculopathy    4. Lumbar foraminal stenosis    5. Status post right knee replacement    . As per information/history obtained from the PADT(patient assessment and documentation tool) - He complains of pain in the knees Right with radiation to the lower leg Right He rates the pain 8/10 and describes it as aching, burning. Pain is made worse by: movement, walking, standing, bending. He denies side effects from the current pain regimen. Patient reports that since the last follow up visit the physical functioning is worse, family/social relationships are worse, mood is worse and sleep patterns are worse, and that the overall functioning is worse. Patient denies neurological bowel or bladder. Mr. Kathleen Núñez presents for ongoing right leg pain. He underwent a right knee replacement on 7/21/20 and is complaining of pain and swelling in the right knee. He believes the pain is coming from his back as in the past he has experienced right leg pain that has been treated with interlaminar steroid injections. The patient states he is in physical therapy for his knee and cannot perfom the therapy due to pain and swelling. He is here today requesting another epidural in hopes that this will help with his right knee and leg pain. Aggravating factors include driving, sitting, standing and walking. Alleviating factors include changing positions. The patient was given oxycodone after his knee surgery which he states does not help with his pain.  The pain does interfere with falling asleep and keeps him up at night. The patient takes baclofen nightly however this does not help him sleep. He does ambulate with a single point cane today in the office.            Associated signs and symptoms:   Neurogenic bowel or bladder symptoms:  no   Perceived weakness:  no   Difficulty walking:  yes              Past Medical History:   Past Medical History:   Diagnosis Date    CAD (coronary artery disease)     Hyperlipidemia     Hypertension     Medical history reviewed with no changes     Medical history reviewed with no changes     Obstructive apnea     Swallowing difficulty     pt to f/u with an ENT    Thyroid disease       Past Surgical History:     Past Surgical History:   Procedure Laterality Date    CARDIAC CATHETERIZATION  01/24/2018    Dr. Rossana Kam - [facility], cor angio    COLONOSCOPY  2011    CORONARY ANGIOPLASTY WITH STENT PLACEMENT  2005, 2015    4 placed at [facility] in 2015 (Dr. Malik Mohamud)   [address]      quadruple    ELBOW SURGERY Right 2001, 2004    KNEE ARTHROSCOPY Left 2000    for meniscus tear    LUMBAR SPINE SURGERY Left 5/15/2019    LEFT L5, S1 TRANSFORAMINAL EPIDURAL STEROID INJECTION WITH FLUOROSCOPY performed by Camilla Giron MD at [address] [place] [address] Bilateral 8/26/2019    BILATERAL L5 TRANSFORAMINAL EPIDURAL STEROID INJECTION WITH FLUOROSCOPY performed by Camilla Giron MD at [address] [place] [address] Bilateral 12/23/2019    BILATERAL L5 TRANSFORAMINAL EPIDURAL STEROID INJECTION WITH FLUOROSOCPY performed by Camilla Giron MD at [address] [place] [address] Bilateral 2/12/2020    BILATERAL L4 TRANSFORMINAL EPIDURAL STEROID INJECTION WITH FLUOROSCOPY performed by Camilla Giron MD at [address] [place] [address] Bilateral 5/15/2020    BILATERAL L4 TRANSFORAMINAL EPIDURAL STEROID INJECTION WITH FLUOROSCOPY performed by Camilla Giron MD at [address] [place] [address] N/A 6/10/2020    L4L5 INTERLAMINAR EPIDURAL STEROID INJECTION WITH FLUOROSCOPY [facility] Left 01/15/2013    Dr. Philly Webber, DPM - endoscopic fasciotomy     Current Medications:     Current Outpatient Medications:     gabapentin (NEURONTIN) 100 MG capsule, Take 1 capsule by mouth nightly for 30 days. Intended supply: 30 days, Disp: 30 capsule, Rfl: 0    losartan (COZAAR) 25 MG tablet, TAKE 1 TABLET BY MOUTH EVERY DAY, Disp: 90 tablet, Rfl: 3    rosuvastatin (CRESTOR) 10 MG tablet, TAKE 1 TABLET BY MOUTH EVERY DAY, Disp: 90 tablet, Rfl: 3    clopidogrel (PLAVIX) 75 MG tablet, TAKE 1 TABLET BY MOUTH EVERY DAY, Disp: 90 tablet, Rfl: 3    carvedilol (COREG) 3.125 MG tablet, TAKE 1 TABLET BY MOUTH TWICE A DAY WITH MEALS, Disp: 180 tablet, Rfl: 3    levothyroxine (SYNTHROID) 175 MCG tablet, 175 mcg , Disp: , Rfl:     aspirin 81 MG chewable tablet, Take 81 mg by mouth daily, Disp: , Rfl:   Allergies:  Latex; Statins; and Niacin and related  Social History:    reports that he has never smoked. He has never used smokeless tobacco. He reports that he does not drink alcohol or use drugs. Family History:   Family History   Problem Relation Age of Onset    Heart Failure Mother     Liver Disease Mother     Alzheimer's Disease Mother        REVIEW OF SYSTEMS:   CONSTITUTIONAL: Denies unexplained weight loss, fevers, chills or fatigue  NEUROLOGICAL: Denies unsteady gait or progressive weakness  MUSCULOSKELETAL: Right knee joint swelling and soreness   GI: Denies nausea, vomiting, diarrhea   : Denies bowel or bladder issues       PHYSICAL EXAM:    Vitals: Temperature 98.9 °F (37.2 °C), resp. rate 12, height 5' 7.99\" (1.727 m), weight 205 lb (93 kg). GENERAL EXAM:  · General Apparence: Patient is adequately groomed with no evidence of malnutrition. · Psychiatric: Orientation: The patient is oriented to time, place and person.  The patient's mood and affect are appropriate · Vascular: Examination reveals no swelling and palpation reveals no tenderness in upper extremities. Swelling of the right knee s/p knee replacement 7/21/202. Good capillary refill. · The lymphatic examination of the neck, axillae and groin reveals all areas to be without enlargement or induration  · Sensation is intact without deficit in the upper and lower extremities to light touch and pinprick  · Coordination of the upper and lower extremities are normal.  · RIGHT UPPER EXTREMITY:  Inspection/examination of the right upper extremity does not show any tenderness, deformity or injury. Range of motion is unremarkable and pain-free. There is no gross instability. There are no rashes, ulcerations or lesions. Strength and tone are normal. No atrophy or abnormal movements are noted. · LEFT UPPER EXTREMITY: Inspection/examination of the left upper extremity does not show any tenderness, deformity or injury. Range of motion is unremarkable and pain-free. There is no gross instability. There are no rashes, ulcerations or lesions. Strength and tone are normal. No atrophy or abnormal movements are noted. LUMBAR/SACRAL EXAMINATION:  · Inspection: Local inspection shows no step-off or bruising. Lumbar alignment is normal. No instability is noted. · Palpation:   No evidence of tenderness at the midline. Lumbar paraspinal tenderness: Mild L4/5 and L5/S1 tenderness  Bursal tenderness No tenderness bilaterally  There is no paraspinal spasm. · Range of Motion: limited by 25% in all planes due to pain  · Strength:   Strength testing is 4/5 right knee flexion and extension. 5/5 all other muscle groups tested. · Special Tests:   Straight leg raise and crossed SLR negative. Ranulfo's testing is negative bilaterally. FADIR's testing is negative bilaterally. · Skin: There are no rashes, ulcerations or lesions. · Reflexes: Reflexes are symmetrically 2+ at the patellar and ankle tendons.   Clonus absent bilaterally at the feet.  · Gait & station: uses a single point cane to ambulate  · Additional Examinations:  · RIGHT LOWER EXTREMITY: Inspection/examination of the right lower extremity reveals right knee incision and swelling status post right knee replacement. Range of motion is normal and pain-free. There is no gross instability. There are no rashes, ulcerations or lesions. Strength and tone are normal. No atrophy or abnormal movements are noted. · LEFT LOWER EXTREMITY:  Inspection/examination of the left lower extremity does not show any tenderness, deformity or injury. Range of motion is normal and pain-free. There is no gross instability. There are no rashes, ulcerations or lesions. Strength and tone are normal. No atrophy or abnormal movements are noted. Diagnostic Testing:    MR Lumbar spine shows  7/14/2020  L1-L2: There is no significant disc bulge, spinal canal stenosis or neural    foraminal narrowing.         L2-L3: There is a disc bulge indenting on the ventral thecal sac.  No    significant spinal canal stenosis.  Facet arthrosis without significant    neural foraminal narrowing.         L3-L4: There is a disc bulge indenting on the ventral thecal sac along with    bilateral facet arthrosis.  Mild spinal canal stenosis.  Mild bilateral    neural foraminal narrowing.  Findings appear progressed.         L4-L5: There is a disc bulge with bilateral facet arthrosis contributing to    mild-to-moderate right and mild left neural foraminal narrowing.  No    significant spinal canal stenosis.  Findings appear progressed.         L5-S1: There is no significant disc bulge, spinal canal stenosis or neural    foraminal narrowing.  Minimal bilateral facet arthrosis.           Impression    1. Degenerative changes contribute to mild spinal canal stenosis at L3-L4. No spinal canal stenosis at the remaining levels. 2. Neural foraminal narrowing at L3-L4 and L4-L5 as above. 3. No evidence of spondylolisthesis. Results for orders placed or performed in visit on 20   COVID-19 Ambulatory    Specimen: Nasopharyngeal Swab   Result Value Ref Range    SARS-CoV-2 Not Detected Not Detected    Source NP swab      Impression:       1. Lumbar radiculopathy    2. DDD (degenerative disc disease), lumbar    3. Spondylosis of lumbar region without myelopathy or radiculopathy    4. Lumbar foraminal stenosis    5. Status post right knee replacement        Plan:  Clinical Course: Above diagnoses are worsening    I discussed the diagnosis and the treatment options with Clayton Ascencio today. In Summary:  The various treatment options were outlined and discussed with Clayton Ascencio including:  Conservative care options: physical therapy, ice, medications, bracing, and activity modification. The indications for therapeutic injections. The indications for additional imaging/laboratory studies. The indications for (possible future) interventions. After considering the various options discussed, Clayton Silva elected to pursue a course of treatment that includes the followin. Medications:  I will add a Gabapentin 100 mg qhs to the current regimen. Counseled on risks, benefits and alternatives and recommended not to take the medicine and drive or operate heavy machinery. 2. PT: Continue with physical therapy for right knee replacement. 3. Further studies:  No further studies. 4. Interventional:  Patient is s/p knee right knee replacement. Back pain is doing well. 5. Follow up:  4-6 weeks      Clayton Antoniocock was instructed to call the office if his symptoms worsen or if new symptoms appear prior to the next scheduled visit. He is specifically instructed to contact the office between now & his scheduled appointment if he has concerns related to his condition or if he needs assistance in scheduling the above tests.  He is welcome to call for an appointment sooner if he has any additional concerns or questions. Dea Be PA-C   Board Certified by the [employer] on Certification of Physician Assistants  Kimberli Cunha 58  Partner of [facility] ([facility])             This dictation was performed with a verbal recognition program [facility]) and it was checked for errors. It is possible that there are still dictated errors within this office note. If so, please bring any errors to my attention for an addendum. All efforts were made to ensure that this office note is accurate.

## 2020-08-21 ENCOUNTER — OFFICE VISIT (OUTPATIENT)
Dept: CARDIOLOGY CLINIC | Age: 70
End: 2020-08-21
Payer: MEDICARE

## 2020-08-21 VITALS
SYSTOLIC BLOOD PRESSURE: 130 MMHG | WEIGHT: 202 LBS | OXYGEN SATURATION: 96 % | HEIGHT: 68 IN | DIASTOLIC BLOOD PRESSURE: 70 MMHG | BODY MASS INDEX: 30.62 KG/M2 | HEART RATE: 68 BPM

## 2020-08-21 PROCEDURE — 1036F TOBACCO NON-USER: CPT | Performed by: INTERNAL MEDICINE

## 2020-08-21 PROCEDURE — 3017F COLORECTAL CA SCREEN DOC REV: CPT | Performed by: INTERNAL MEDICINE

## 2020-08-21 PROCEDURE — 99214 OFFICE O/P EST MOD 30 MIN: CPT | Performed by: INTERNAL MEDICINE

## 2020-08-21 PROCEDURE — 1123F ACP DISCUSS/DSCN MKR DOCD: CPT | Performed by: INTERNAL MEDICINE

## 2020-08-21 PROCEDURE — G8427 DOCREV CUR MEDS BY ELIG CLIN: HCPCS | Performed by: INTERNAL MEDICINE

## 2020-08-21 PROCEDURE — 4040F PNEUMOC VAC/ADMIN/RCVD: CPT | Performed by: INTERNAL MEDICINE

## 2020-08-21 PROCEDURE — G8417 CALC BMI ABV UP PARAM F/U: HCPCS | Performed by: INTERNAL MEDICINE

## 2020-08-21 NOTE — PROGRESS NOTES
[facility]   Cardiac Evaluation      Patient: Rhiannon Mccoy  YOB: 1950  Date: 8/21/20       Chief Complaint   Patient presents with    Coronary Artery Disease    Cardiomyopathy    Hypertension    Hyperlipidemia        Referring provider: Bhaskar Garcia MD    History of Present Illness:   Mr Sumeet Spicer is seen today for follow up to recent hospitalization. He was initially seen for re-evaluation of his coronary disease in 2012. At that time he had 4 stents placed at [facility] in 2005. He states he did not have an MI, did not have chest pain. His symptoms at that time were fatigue. He was transferred to [facility] after having an angiogram at [facility]. Dr Sridhar Astorga performed his initial angiogram. Karan's other history includes hypertension and hyperlipidemia. He does not smoke or drink alcohol. June 29, 2015 Mr Sumeet Spicer was on vacation. He developed chest pain. He was diagnosed with acute MI and an angiogram was performed with 3 stents placed to LCX and OM2. Angiogram 7/10/15 that revealed EF 40%, LAD - diffuse disease with long stented area in the prox and mid vessel with diffuse instent 40%, distal apical 90%, LCX - 40% ostial, 30% before LCX stent, 40% just prox to large PL branch, bifurcation stents in the LCX-OM3 junction which are patent. OM1 small with 90% ostial, RCA - dom, diffusely diseased, 50%prox, long stented area with localized 60% stenosis, 50% between stents, 2nd stent prox to PDA is patent, PDA - prox 50%. He then had a plain stress test for rehab and participated in rehab. January, 2018 he was admitted to [facility] after a grossly abnormal gxt. Cath performed then referred for CABG. Dr Nadir Orellana operated 1/29/18 w/ CABG x 4 LIMA-LAD, SV-PDA, SV-Diag-OM2. Today, Mr Sumeet Spicer states 8/18/20 while driving he developed severe dizziness. He states everything felt distorted and he could not see straight. He pulled over and closed his eyes. He states dizziness continued.  He walked around for a while, then developed tingling in his hands and feet. He called 911 and was taken to hospital. He was admitted to [facility] and underwent stress test which showed stable findings. He states he has felt fine since. Cecy Serrano has had vertigo in the past and states this was not the same. He is concerned about this because he had the same thing prior to needing CABG and is requesting an angiogram. He states with previous cardiac event he had similar symptoms. Past Medical History:   has a past medical history of CAD (coronary artery disease), Hyperlipidemia, Hypertension, Medical history reviewed with no changes, Medical history reviewed with no changes, Obstructive apnea, Swallowing difficulty, and Thyroid disease. Surgical History:   has a past surgical history that includes Coronary angioplasty with stent (2005, 2015); Elbow surgery (Right, 2001, 2004); Knee arthroscopy (Left, 2000); Plantar fascia surgery (Left, 01/15/2013); Colonoscopy (2011); Cardiac catheterization (01/24/2018); Coronary artery bypass graft; Lumbar spine surgery (Left, 5/15/2019); Lumbar spine surgery (Bilateral, 8/26/2019); Pain management procedure (Bilateral, 12/23/2019); Pain management procedure (Bilateral, 2/12/2020); Pain management procedure (Bilateral, 5/15/2020); and Pain management procedure (N/A, 6/10/2020). 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 Social History Main Topics    Smoking status: Never Smoker     Smokeless tobacco: Never Used    Alcohol Use: No    Drug Use:     Sexually Active:       Other Topics Concern    Not on file     Social History Narrative    No narrative on file       Family History:  family history includes Alzheimer's Disease in his mother; Heart Failure in his mother; Liver Disease in his mother. Allergies:  Latex; Statins; and Niacin and related     Review of Systems:   · Constitutional: there has been no unanticipated weight loss. · Eyes: No visual changes   · ENT: No Headaches, hearing loss or vertigo. No mouth sores or sore throat. · Cardiovascular: Reviewed in HPI  · Respiratory: No cough or wheezing, no sputum production. · Gastrointestinal: No abdominal pain, appetite loss, blood in stools. No change in bowel or bladder habits. · Genitourinary: No nocturia, dysuria, trouble voiding  · Musculoskeletal:  No gait disturbance, weakness or joint complaints. · Integumentary: No rash or pruritis. · Neurological: No headache, change in muscle strength, numbness or tingling. No change in gait, balance, coordination, mood, affect, memory, mentation, behavior. · Psychiatric: anxious   · Endocrine: No malaise or fever  · Hematologic/Lymphatic: No abnormal bruising or bleeding, blood clots or swollen lymph nodes. · Allergic/Immunologic: No nasal congestion or hives. Physical Examination:    Vitals:    08/21/20 0848   BP: 130/70   Site: Left Upper Arm   Position: Sitting   Cuff Size: Medium Adult   Pulse: 68   SpO2: 96%   Weight: 202 lb (91.6 kg)   Height: 5' 8\" (1.727 m)     Body mass index is 30.71 kg/m².      Wt Readings from Last 3 Encounters:   08/21/20 202 lb (91.6 kg)   08/20/20 205 lb (93 kg)   06/26/20 205 lb 0.4 oz (93 kg)      BP Readings from Last 3 Encounters:   08/21/20 130/70   06/10/20 (!) 134/94   05/15/20 138/78      Constitutional and General Appearance:  appears stated age  Respiratory:  · Normal excursion and expansion without use of accessory muscles  · Resp Auscultation: Normal breath sounds without dullness  Cardiovascular:  · The apical impulses not displaced  · Heart is regular rate and rhythm with normal S1, S2  · The carotid upstroke is normal, no bruit noted   · JVP is not elevated  · Peripheral pulses are symmetrical  · There is no clubbing, cyanosis of the extremities  · No edema  · Femoral Arteries: 2+ and equal  · Pedal Pulses: 2+ and equal   Abdomen:  · No masses or tenderness  · Normal bowel sounds  Neurological/Psychiatric:  · Alert and oriented x3  · Moves all extremities well  · Exhibits normal gait balance and coordination      Assessment/Plan  Dizziness - had severe episode recently while driving  1. Carotid stenosis: stable, doppler 7/12> <50% stenosis bilateral, repeat doppler 8/4/17: 0% bilateral   2. CAD (coronary artery disease): stable  GXT 8/15/20: ([facility]) no reversible perfusion defect, fixed perfusion in inferior wall wall of left ventricle, suggestive of myocardial infarct in the expected territory of LCx  Echo 3/13/20:  EF 50-55%, mild hypokinesis of inferior walls. Grade 1 DD, mitral annular calcification is present. Trivial MR, aortic valve appears sclerotic but opens adequately. RVSP 30-35mmHg  CABG 1/29/18: CABG x 4 LIMA-LAD, SV-PDA, SV-Diag-OM2  Cath 1/24/18: LVEDP - 5. LVgram - mild inferior hypokinesis, EF 50%  Cors: LM - nl, LAD - proximal stents (2) with instent stenosis up to 95%, 70% between dx2 and dx3  DX1 - totalled with faint filling, DX2 moderate in caliber, RI - small with 90% ostial, LCX - 40% ostial, 50% between om1 and om2, mid stent and stent in OM2, 40% after mid stent, OM3 70%   RCA - dominant with 50% prox, long stented mid vessel with instent stenoses of 50-80%  Ff by 90% with another stent at RCA/PDA with instent restenosis of 95% Faint r-l collateral to the area of dx1  GXT 1/24/18: medium sized inferolateral fixed defect of moderate intensity consistent w/ infarction in territory of LCx and/or RCA. Small to medium sized anterior completely reversible defect of moderate intensity consistent with ischemia in territory of LAD. Small to medium sized inferolateral completely reversible defect of moderate intensity consistent with ischemia in Lcx and/or RCA. EF 47%. Referred to angiogram  Echo 4/28/17: EF 55%.  There is inferior hypokinesis. Mild cLVH. Diastolic filling parameters suggests grade I diastolic dysfunction. Mild mitral annular calcification is present. Aortic valve appears sclerotic but opens adequately. trivial mitral, pulmonic and tricuspid regurgitation with RVSP 25 mmHg. Cath 7/10/15: LVEDP - 16, EF 40%  Cors: LM - nl. LAD - diffuse disease with long stented area in the prox and mid vessel with diffuse instent 40%, distal apical 90%. LCX - 40% ostial, 30% before LCX stent, 40% just prox to large PL branch, bifurcation stents in the LCX-OM3 junction which are patent. OM1 small with 90% ostial  RCA - dom, diffusely diseased, 50%prox, long stented area with localized 60% stenosis, 50% between stents, 2nd stent prox to PDA is patent, PDA - prox 50%  FFR of RCA through all diseased segments was 0.85 ff 2 min of adenosine  Echo 6/2915 [facility]) [address], mildly reduced EF 50-55%, right ventricle mildly dilated, right atrium mildly dilated, left ventricular posterior hypokinesis  Cath 6/29/15 ([facility]): right radial artery approach. Left main nl, LAD 30% proximal stent and apical 75%, Cx totally occluded. OM3 50%, RCA 30% in stent and distal 70%, PDA small to moderate with 80% proximal. No LV gram. Aspiration of Cx and OM with 2.5 x12 BERNIE in LCx, 2.75 x14 proximal to first stent in Cx, plaque shift to proximal OM with 2.75 x22 BERNIE to OM  stents x 4 at [facility] in 2005 (do not have records currently)  12/28/12 nuclear stress test> normal perfusion/EF, B/P elevated at rest & during peak exercise   3. Hypertension:   Stable   4. Hyperlipidemia: 10/3/19: ; TRIG 204; HDL 30; LDL 71, on statin, had aches on 80 mg Pravastatin. changed back to Crestor 10 mg, which he states he is tolerating   5. PVC's -palpitations, holter monitor 9/18: sinus rhythm, PVC's, PAC's. MCOT 11/27/2015 SB, no PVCs      PLAN:  Will proceed with [facility]. He is going to call with when he gets home with availability.  Risks/benefits/possible outcomes explained. Scribe's attestation: This note was scribed in the presence of Dr Wendy Nelson MD by Lana Woodson RN. The scribe's documentation has been prepared under my direction and personally reviewed by me in its entirety. I confirm that the note above accurately reflects all work, treatment, procedures, and medical decision making performed by me. Thank you for allowing to me to participate in the care of Rhiannon Mccoy.

## 2020-08-28 ENCOUNTER — TELEPHONE (OUTPATIENT)
Dept: ORTHOPEDIC SURGERY | Age: 70
End: 2020-08-28

## 2020-08-28 NOTE — TELEPHONE ENCOUNTER
FAXED MRI REPORTS TO O FOR PATIENT .     LM FOR X-RAY DEPARTMENT FOR X-RAY CD OF THE IMAGES     PATIENT WILL NEED TO SIGN A RELEASE (INCLUDED)

## 2020-09-14 ENCOUNTER — TELEPHONE (OUTPATIENT)
Dept: CARDIOLOGY CLINIC | Age: 70
End: 2020-09-14

## 2020-09-14 NOTE — LETTER
[address] [place] [address]  Phone: [phone]  Fax: [phone]    Rina Burton MD        September 14, 2020     Patient: Rhiannon Mccoy   YOB: 1950   Date of Visit: 9/14/2020       To Whom It May Concern: It is my medical opinion that Olman Bond is cleared for knee surgery from a cairdaic standpoint. If you have any questions or concerns, please don't hesitate to call.     Sincerely,        Rina Burton MD

## 2020-09-16 ENCOUNTER — TELEPHONE (OUTPATIENT)
Dept: CARDIOLOGY CLINIC | Age: 70
End: 2020-09-16

## 2020-09-17 ENCOUNTER — TELEPHONE (OUTPATIENT)
Dept: CARDIOLOGY CLINIC | Age: 70
End: 2020-09-17

## 2020-09-17 NOTE — LETTER
[address]  [address] [address]  Phone: [phone]  Fax: [phone]    Brodie Little MD        2020    [place]  [address] [facility] [address]  : 1950    To whom it may concern: In regards to [facility] knee replacement surgery, I am not attesting that a facility without a cath lab can handle any cardiac emergency. If you have any questions or concerns, please don't hesitate to call.     Sincerely,        Brodie Little MD

## 2020-09-17 NOTE — TELEPHONE ENCOUNTER
This patient has a hx of heart disease and should have his surgery in a facility that can manage cardiac emergencies.  His previous surgery was done at [facility].

## 2020-09-17 NOTE — LETTER
[address] [place] [address] Cardiology [place] [address] [address] [address]  Phone: [phone]  Fax: [phone]    Natasha Bowers MD        2020    [facility]  [address] [facility] [address]  : 1950    To whom it may concern:    Tamei Stiles,  1950,  has a hx of heart disease and should have his surgery in a facility that can manage cardiac emergencies. His previous surgery was done at [facility].    If you have any questions or concerns, please don't hesitate to call.     Sincerely,        Natasha Bowers MD

## 2020-09-17 NOTE — TELEPHONE ENCOUNTER
[facility] received the Cardiac Cx letter that was faxed to the physician but they need the letter to state \"Dr. Gwynneth Aschoff is aware that patient is having surgery in an outpatient facility\". Can a new letter to faxed with this addition? Patient's surgery is 9-21. Fax #[phone]. Please advise. Thank you.

## 2020-09-17 NOTE — TELEPHONE ENCOUNTER
Alva calling back, she received the new letter and their facility can handle cardiac emergencies, however the Anesthesiologist requires the Clearance letter to actually state \"Dr Eddie Del Angel is aware that the patient is having surgery in an outpatient facility\" before he will perform the procedure. Please call to advise. Thank you.

## 2020-09-18 ENCOUNTER — TELEPHONE (OUTPATIENT)
Dept: CARDIOLOGY CLINIC | Age: 70
End: 2020-09-18

## 2020-09-18 NOTE — TELEPHONE ENCOUNTER
Remigio omrris is asking for yesterday's clearance letter to be faxed to [phone] for Monday's sx. Any questions please call.

## 2020-11-16 RX ORDER — CARVEDILOL 3.12 MG/1
TABLET ORAL
Qty: 180 TABLET | Refills: 3 | Status: SHIPPED | OUTPATIENT
Start: 2020-11-16 | End: 2022-01-06 | Stop reason: SDUPTHER

## 2020-11-23 ENCOUNTER — TELEPHONE (OUTPATIENT)
Dept: CARDIOLOGY CLINIC | Age: 70
End: 2020-11-23

## 2020-11-23 NOTE — LETTER
[address] Cardiology [facility]  [place] [address] [address]  Phone: [phone]  Fax: [phone]    Juvenal Solitario MD        2020    [place]  [address] [facility] [address]  : [birth year]    To whom it may concern:    Mr Mahad Wilder has stable coronary artery disease and may undergo TKR in a hospital setting. If you have any questions or concerns, please don't hesitate to call.     Sincerely,        Juvenal Solitario MD

## 2020-11-23 NOTE — TELEPHONE ENCOUNTER
[address] called asking for cardiac clearance for Guero Ira, he is having Lt TKR tomorrow at [facility]. Letter created and faxed per Dr Praveen Hernandez instructions.

## 2020-11-30 ENCOUNTER — TELEPHONE (OUTPATIENT)
Dept: CARDIOLOGY CLINIC | Age: 70
End: 2020-11-30

## 2020-11-30 NOTE — TELEPHONE ENCOUNTER
Pt calling and wants to know when he should restart Plavix and ASA post op knee replacement. Dr. Merlinda Moh note mentioned he should hold Plavix while he is on Eliquis 2.5 mg bid.

## 2020-12-02 ENCOUNTER — TELEPHONE (OUTPATIENT)
Dept: PULMONOLOGY | Age: 70
End: 2020-12-02

## 2020-12-07 ENCOUNTER — VIRTUAL VISIT (OUTPATIENT)
Dept: PULMONOLOGY | Age: 70
End: 2020-12-07
Payer: MEDICARE

## 2020-12-07 PROCEDURE — 1123F ACP DISCUSS/DSCN MKR DOCD: CPT | Performed by: NURSE PRACTITIONER

## 2020-12-07 PROCEDURE — 99214 OFFICE O/P EST MOD 30 MIN: CPT | Performed by: NURSE PRACTITIONER

## 2020-12-07 PROCEDURE — 4040F PNEUMOC VAC/ADMIN/RCVD: CPT | Performed by: NURSE PRACTITIONER

## 2020-12-07 PROCEDURE — 3017F COLORECTAL CA SCREEN DOC REV: CPT | Performed by: NURSE PRACTITIONER

## 2020-12-07 PROCEDURE — G8427 DOCREV CUR MEDS BY ELIG CLIN: HCPCS | Performed by: NURSE PRACTITIONER

## 2020-12-07 ASSESSMENT — SLEEP AND FATIGUE QUESTIONNAIRES
ESS TOTAL SCORE: 5
HOW LIKELY ARE YOU TO NOD OFF OR FALL ASLEEP WHILE SITTING AND READING: 2
HOW LIKELY ARE YOU TO NOD OFF OR FALL ASLEEP WHILE SITTING INACTIVE IN A PUBLIC PLACE: 0
HOW LIKELY ARE YOU TO NOD OFF OR FALL ASLEEP WHEN YOU ARE A PASSENGER IN A CAR FOR AN HOUR WITHOUT A BREAK: 0
HOW LIKELY ARE YOU TO NOD OFF OR FALL ASLEEP WHILE SITTING QUIETLY AFTER LUNCH WITHOUT ALCOHOL: 0
HOW LIKELY ARE YOU TO NOD OFF OR FALL ASLEEP WHILE LYING DOWN TO REST IN THE AFTERNOON WHEN CIRCUMSTANCES PERMIT: 3
HOW LIKELY ARE YOU TO NOD OFF OR FALL ASLEEP WHILE SITTING AND TALKING TO SOMEONE: 0
HOW LIKELY ARE YOU TO NOD OFF OR FALL ASLEEP WHILE WATCHING TV: 0
HOW LIKELY ARE YOU TO NOD OFF OR FALL ASLEEP IN A CAR, WHILE STOPPED FOR A FEW MINUTES IN TRAFFIC: 0

## 2020-12-07 NOTE — PROGRESS NOTES
Leanne Roberts         : 1950    Diagnosis: [x] ENA (G47.33) [] CSA (G47.31) [] Apnea (G47.30)   Length of Need: [x] 12 Months [] 99 Months [] Other:    Machine (GERRY!): [x] Respironics Dream Station      Auto [] ResMed AirSense     Auto [] Other:     []  CPAP () [x] Bilevel ()   Mode: [] Auto [] Spontaneous    Mode: [x] Auto [] Spontaneous                 AUTO BIPAP - Settings (Raquel)  IPAP Max: 25 cmH2O  EPAP Min: 6 cmH2O  Pressure Support Min: 2  Pressure Support Max: 5          Comfort Settings:   - Ramp Pressure: 4 cmH2O                                        - Ramp time: 15 min                                     -  Flex/EPR - 3 full time                                    - For ResMed Bilevel (TiMax-4 sec   TiMin- 0.2 sec)     Humidifier: [x] Heated ()        [x] Water chamber replacement ()/ 1 per 6 months        Mask:   [x] Nasal () /1 per 3 months [] Full Face () /1 per 3 months   [x] Patient choice -Size and fit mask [] Patient Choice - Size and fit mask   [] Dispense:  [] Dispense:    [x] Headgear () / 1 per 3 months [] Headgear () / 1 per 3 months   [] Replacement Nasal Cushion ()/2 per month [] Interface Replacement ()/1 per month   [x] Replacement Nasal Pillows ()/2 per month         Tubing: [x] Heated ()/1 per 3 months    [] Standard ()/1 per 3 months [] Other:           Filters: [x] Non-disposable ()/1 per 6 months     [x] Ultra-Fine, Disposable ()/2 per month        Miscellaneous: [] Chin Strap ()/ 1 per 6 months [] O2 bleed-in:       LPM   [] Oximetry on CPAP/Bilevel []  Other:    [x] Modem: ()         Start Order Date: 20    MEDICAL JUSTIFICATION:  I, the undersigned, certify that the above prescribed supplies are medically necessary for this patients wellbeing.   In my opinion, the supplies are both reasonable and necessary in reference to accepted standards of medicalpractice in treatment of this patients condition.     MARY Le CNP      NPI: [id number]       Order Signed Date: 12/07/20    Electronically signed by MARY Le CNP on 12/7/2020 at 9:52 AM

## 2020-12-07 NOTE — ASSESSMENT & PLAN NOTE
Unable to Review compliance download with pt. Supplies and parts as needed for his machine. These are medically necessary. Continue medications per his PCP and other physicians. Limit caffeine use after 3pm.  Encouraged him to work on weight loss through diet and exercise. Diagnoses of Essential hypertension, benign, Coronary artery disease involving native coronary artery of native heart without angina pectoris, and Class 1 obesity with alveolar hypoventilation without serious comorbidity with body mass index (BMI) of 31.0 to 31.9 in [facility]) were pertinent to this visit. The chronic medical conditions listed are directly related to the primary diagnosis listed above. The management of the primary diagnosis affects the secondary diagnosis and vice versa.

## 2020-12-07 NOTE — PROGRESS NOTES
rested when using the machine   [x] Yes  [] No     Pressure is comfortable with inspiration and expiration  [x] Yes  [] No     Noticed changes in pressure   [] Yes  [] No  [x] NA   Mask is fitting well  [x] Yes  [] No   Noting Mask Air Leak  [] Yes  [x] No   Having painful Aerophagia  [] Yes  [x] No   Nocturia   0  per night. Having  HA upon waking  [] Yes  [x] No   Dry mouth upon waking   Dry Nose  Dry Eyes  [] Yes  [x] No   Congestion upon waking   [] Yes  [x] No    Nose Bleeds  [] Yes  [x] No   Using Sleep Aides    [x] NA   Understands how to change humidification and/or tubing temperature for comfort while at home  [x] Yes  [] No     Difficulties falling asleep  [] Yes  [x] No   Difficulties staying asleep  [] Yes  [x] No   Approximate time to bed  10pm-2am   Approximate wake time  8:30am   Taking Naps  no   If taking naps usual length    [x] NA   If taking naps using the machine  [] Yes  [] No  [x] NA [] With and With out    Drowsy when driving  [] Yes  [x] No     Does patient carry a DOT/CDL  [] Yes  [x] No     Does patient carry FAA/Pil[employer] License   [] Yes  [x] No      Any concerns noted with the machine at this time  [x] Yes  [] No    Patient ordered a new machine:   Concerns with the machine: aging and not working the same  Increased symptoms include but are not limited to not getting the rest he was prior   Machine is greater then 11years old       Diagnosis Orders   1. Obstructive apnea     2. Essential hypertension, benign     3. Coronary artery disease involving native coronary artery of native heart without angina pectoris     4. Class 1 obesity with alveolar hypoventilation without serious comorbidity with body mass index (BMI) of 31.0 to 31.9 in [facility])         The chronic medical conditions listed are directly related to the primary diagnosis listed above. The management of the primary diagnosis affects the secondary diagnosis and vice versa.       Assessment/Plan:     Essential hypertension, benign  Chronic- Stable. Cont meds per PCP and other physicians. Coronary artery disease involving native coronary artery without angina pectoris  Chronic- Stable. Cont meds per PCP and other physicians. Class 1 obesity with alveolar hypoventilation without serious comorbidity with body mass index (BMI) of 31.0 to 31.9 in adult ([facility])  Chronic-Stable. Encouraged him to work on weight loss through diet and exercise. Obstructive apnea  Unable to Review compliance download with pt. Supplies and parts as needed for his machine. These are medically necessary. Continue medications per his PCP and other physicians. Limit caffeine use after 3pm.  Encouraged him to work on weight loss through diet and exercise. Diagnoses of Essential hypertension, benign, Coronary artery disease involving native coronary artery of native heart without angina pectoris, and Class 1 obesity with alveolar hypoventilation without serious comorbidity with body mass index (BMI) of 31.0 to 31.9 in [facility]) were pertinent to this visit. The chronic medical conditions listed are directly related to the primary diagnosis listed above. The management of the primary diagnosis affects the secondary diagnosis and vice versa. The primary encounter diagnosis was Obstructive apnea. Diagnoses of Essential hypertension, benign, Coronary artery disease involving native coronary artery of native heart without angina pectoris, and Class 1 obesity with alveolar hypoventilation without serious comorbidity with body mass index (BMI) of 31.0 to 31.9 in [facility]) were also pertinent to this visit. The chronic medical conditions listed are directly related to the primary diagnosis listed above. The management of the primary diagnosis affects the secondary diagnosis and vice versa.      - Educated patient and reviewed compliance download with pt.    -Supplies and parts as needed for his machine, these are medically necessary.    - Patient using [place] for supplies  -Continue medications per his PCP and other physicians.   -Limit caffeine use after 3pm.    -Encouraged him to work on weight loss through diet and exercise. -  Patient able to access video feed. Visit completed via video chat communications. 25 min spent with patient.   - Education on obtaining the new machine, and monitoring the AHI with the new machine (and post operatively)   -F/U: 3 month. No orders of the defined types were placed in this encounter. No orders of the defined types were placed in this encounter. No orders of the defined types were placed in this encounter.       Naveed Astorga, MSN, RN, CNP

## 2021-02-17 RX ORDER — CLOPIDOGREL BISULFATE 75 MG/1
TABLET ORAL
Qty: 90 TABLET | Refills: 3 | Status: SHIPPED | OUTPATIENT
Start: 2021-02-17 | End: 2022-04-18 | Stop reason: SDUPTHER

## 2021-06-09 ENCOUNTER — OFFICE VISIT (OUTPATIENT)
Dept: CARDIOLOGY CLINIC | Age: 71
End: 2021-06-09
Payer: MEDICARE

## 2021-06-09 VITALS
HEIGHT: 68 IN | DIASTOLIC BLOOD PRESSURE: 64 MMHG | WEIGHT: 211 LBS | OXYGEN SATURATION: 98 % | BODY MASS INDEX: 31.98 KG/M2 | SYSTOLIC BLOOD PRESSURE: 130 MMHG | HEART RATE: 57 BPM

## 2021-06-09 DIAGNOSIS — I10 ESSENTIAL HYPERTENSION, BENIGN: ICD-10-CM

## 2021-06-09 DIAGNOSIS — I25.5 ISCHEMIC CARDIOMYOPATHY: Chronic | ICD-10-CM

## 2021-06-09 DIAGNOSIS — I25.10 CORONARY ARTERY DISEASE INVOLVING NATIVE CORONARY ARTERY OF NATIVE HEART WITHOUT ANGINA PECTORIS: Primary | Chronic | ICD-10-CM

## 2021-06-09 PROCEDURE — G8427 DOCREV CUR MEDS BY ELIG CLIN: HCPCS | Performed by: INTERNAL MEDICINE

## 2021-06-09 PROCEDURE — 3017F COLORECTAL CA SCREEN DOC REV: CPT | Performed by: INTERNAL MEDICINE

## 2021-06-09 PROCEDURE — 1123F ACP DISCUSS/DSCN MKR DOCD: CPT | Performed by: INTERNAL MEDICINE

## 2021-06-09 PROCEDURE — G8417 CALC BMI ABV UP PARAM F/U: HCPCS | Performed by: INTERNAL MEDICINE

## 2021-06-09 PROCEDURE — 99214 OFFICE O/P EST MOD 30 MIN: CPT | Performed by: INTERNAL MEDICINE

## 2021-06-09 PROCEDURE — 4040F PNEUMOC VAC/ADMIN/RCVD: CPT | Performed by: INTERNAL MEDICINE

## 2021-06-09 PROCEDURE — 1036F TOBACCO NON-USER: CPT | Performed by: INTERNAL MEDICINE

## 2021-06-09 RX ORDER — LOSARTAN POTASSIUM 50 MG/1
50 TABLET ORAL DAILY
COMMUNITY
Start: 2021-05-06

## 2021-06-09 RX ORDER — FUROSEMIDE 20 MG/1
20 TABLET ORAL DAILY PRN
Qty: 30 TABLET | Refills: 3 | Status: SHIPPED | OUTPATIENT
Start: 2021-06-09 | End: 2022-04-21

## 2021-06-09 NOTE — PROGRESS NOTES
[address]   Cardiac Evaluation      Patient: Kvng Chandra  YOB: 1950  Date: 6/9/21       Chief Complaint   Patient presents with    Coronary Artery Disease    Hypertension    Hyperlipidemia        Referring provider: Amparo Ibanez MD    History of Present Illness:   Mr Valeria Rodrigez is seen today for follow up. He was initially seen for re-evaluation of his coronary disease in 2012. He had  4 stents placed at [facility] in 2005. He states he did not have an MI, did not have chest pain. His symptoms at that time were fatigue. He was transferred to [facility] after having an angiogram at [facility] by Dr Lyric Donaldson. Karan's other history includes hypertension and hyperlipidemia. He does not smoke or drink alcohol. June 29, 2015 Mr Valeria Rodrigez was on vacation. He developed chest pain. He was diagnosed with acute MI and an angiogram was performed with 3 stents placed to LCX and OM2. Angiogram 7/10/15 that revealed EF 40%, LAD - diffuse disease with long stented area in the prox and mid vessel with diffuse instent 40%, distal apical 90%, LCX - 40% ostial, 30% before LCX stent, 40% just prox to large PL branch, bifurcation stents in the LCX-OM3 junction which are patent. OM1 small with 90% ostial, RCA - dom, diffusely diseased, 50%prox, long stented area with localized 60% stenosis, 50% between stents, 2nd stent prox to PDA is patent, PDA - prox 50%. He then had a plain stress test for rehab and participated in rehab. January, 2018 he was admitted to [facility] after a grossly abnormal gxt. Cath performed then referred for CABG. Dr Inga Osuna operated 1/29/18 w/ CABG x 4 LIMA-LAD, SV-PDA, SV-Diag-OM2. Today, Mr Valeria Rodrigez has had bilateral knee replacements. He has edema in his LE's, mostly in the feet. Zoe Kitchen does band exercises and works with an exercise ball. He continues to teach Karate. He denies any chest pain, palpitations, RUIZ, dizziness.      Past Medical History:   has a past medical history of CAD dysuria, trouble voiding  · Musculoskeletal:  No gait disturbance, weakness or joint complaints. · Integumentary: No rash or pruritis. · Neurological: No headache, change in muscle strength, numbness or tingling. No change in gait, balance, coordination, mood, affect, memory, mentation, behavior. · Psychiatric: anxious   · Endocrine: No malaise or fever  · Hematologic/Lymphatic: No abnormal bruising or bleeding, blood clots or swollen lymph nodes. · Allergic/Immunologic: No nasal congestion or hives. Physical Examination:    Vitals:    06/09/21 1558   BP: 130/64   Site: Left Upper Arm   Position: Sitting   Cuff Size: Medium Adult   Pulse: 57   SpO2: 98%   Weight: 211 lb (95.7 kg)   Height: 5' 8\" (1.727 m)     Body mass index is 32.08 kg/m². Wt Readings from Last 3 Encounters:   06/09/21 211 lb (95.7 kg)   08/21/20 202 lb (91.6 kg)   08/20/20 205 lb (93 kg)      BP Readings from Last 3 Encounters:   06/09/21 130/64   08/21/20 130/70   06/10/20 (!) 134/94      Constitutional and General Appearance:  appears stated age  Respiratory:  · Normal excursion and expansion without use of accessory muscles  · Resp Auscultation: Normal breath sounds without dullness  Cardiovascular:  · The apical impulses not displaced  · Heart is regular rate and rhythm with normal S1, S2  · The carotid upstroke is normal, no bruit noted   · JVP is not elevated  · Peripheral pulses are symmetrical  · There is no clubbing, cyanosis of the extremities  · 1+ edema BLE  · Femoral Arteries: 2+ and equal  · Pedal Pulses: 2+ and equal   Abdomen:  · No masses or tenderness  · Normal bowel sounds  Neurological/Psychiatric:  · Alert and oriented x3  · Moves all extremities well  · Exhibits normal gait balance and coordination      Assessment/Plan  1. Carotid stenosis: stable, doppler 7/12: <50% stenosis bilateral, repeat doppler 8/4/17: 0% bilateral   2.  CAD (coronary artery disease): stable  GXT 8/15/20: ([facility]) done from the ER after presenting with dizziness. no reversible perfusion defect, fixed perfusion in inferior wall wall of left ventricle, suggestive of myocardial infarct in the expected territory of LCx  Echo 3/13/20:  EF 50-55%, mild hypokinesis of inferior walls. Grade 1 DD, mitral annular calcification is present. Trivial MR, aortic valve appears sclerotic but opens adequately. RVSP 30-35mmHg  CABG 1/29/18: CABG x 4 LIMA-LAD, SV-PDA, SV-Diag-OM2  Cath 1/24/18: LVEDP - 5. LVgram - mild inferior hypokinesis, EF 50%  Cors: LM - nl, LAD - proximal stents (2) with instent stenosis up to 95%, 70% between dx2 and dx3  DX1 - totalled with faint filling, DX2 moderate in caliber, RI - small with 90% ostial, LCX - 40% ostial, 50% between om1 and om2, mid stent and stent in OM2, 40% after mid stent, OM3 70%   RCA - dominant with 50% prox, long stented mid vessel with instent stenoses of 50-80%  Ff by 90% with another stent at RCA/PDA with instent restenosis of 95% Faint r-l collateral to the area of dx1  GXT 1/24/18: medium sized inferolateral fixed defect of moderate intensity consistent w/ infarction in territory of LCx and/or RCA. Small to medium sized anterior completely reversible defect of moderate intensity consistent with ischemia in territory of LAD. Small to medium sized inferolateral completely reversible defect of moderate intensity consistent with ischemia in Lcx and/or RCA. EF 47%. Referred to angiogram  Echo 4/28/17: EF 55%. There is inferior hypokinesis. Mild cLVH. Diastolic filling parameters suggests grade I diastolic dysfunction. Mild mitral annular calcification is present. Aortic valve appears sclerotic but opens adequately. trivial mitral, pulmonic and tricuspid regurgitation with RVSP 25 mmHg. Cath 7/10/15: LVEDP - 16, EF 40%  Cors: LM - nl. LAD - diffuse disease with long stented area in the prox and mid vessel with diffuse instent 40%, distal apical 90%.  LCX - 40% ostial, 30% before LCX stent, 40% just prox to large PL branch, bifurcation stents in the LCX-OM3 junction which are patent. OM1 small with 90% ostial  RCA - dom, diffusely diseased, 50%prox, long stented area with localized 60% stenosis, 50% between stents, 2nd stent prox to PDA is patent, PDA - prox 50%  FFR of RCA through all diseased segments was 0.85 ff 2 min of adenosine  Echo 6/2915 [facility]) [id number], mildly reduced EF 50-55%, right ventricle mildly dilated, right atrium mildly dilated, left ventricular posterior hypokinesis  Cath 6/29/15 ([place]): right radial artery approach. Left main nl, LAD 30% proximal stent and apical 75%, Cx totally occluded. OM3 50%, RCA 30% in stent and distal 70%, PDA small to moderate with 80% proximal. No LV gram. Aspiration of Cx and OM with 2.5 x12 BERNIE in LCx, 2.75 x14 proximal to first stent in Cx, plaque shift to proximal OM with 2.75 x22 BERNIE to OM  stents x 4 at [facility] in 2005 (do not have records currently)  12/28/12 nuclear stress test> normal perfusion/EF, B/P elevated at rest & during peak exercise   3. Hypertension:   Stable   4. Hyperlipidemia: 9/17/20: ; TRIG 96; HDL 36; LDL 85, on statin, had aches on 80 mg Pravastatin. changed back to Crestor 10 mg, which he states he is tolerating   5. PVC's -palpitations, holter monitor 9/18: sinus rhythm, PVC's, PAC's. MCOT 11/27/2015 SB, no PVCs      PLAN:   For LE edema will give him Lasix 20mg to take daily as needed. Scribe's attestation: This note was scribed in the presence of Dr Anahi Lowe MD by Julio Sneed RN. The scribe's documentation has been prepared under my direction and personally reviewed by me in its entirety. I confirm that the note above accurately reflects all work, treatment, procedures, and medical decision making performed by me. Thank you for allowing to me to participate in the care of Rose Mary Andino.

## 2021-09-21 RX ORDER — ROSUVASTATIN CALCIUM 10 MG/1
TABLET, COATED ORAL
Qty: 90 TABLET | Refills: 3 | Status: SHIPPED | OUTPATIENT
Start: 2021-09-21 | End: 2022-09-14

## 2022-01-06 RX ORDER — CARVEDILOL 3.12 MG/1
TABLET ORAL
Qty: 180 TABLET | Refills: 3 | Status: SHIPPED | OUTPATIENT
Start: 2022-01-06

## 2022-04-18 RX ORDER — CLOPIDOGREL BISULFATE 75 MG/1
TABLET ORAL
Qty: 90 TABLET | Refills: 0 | Status: SHIPPED | OUTPATIENT
Start: 2022-04-18 | End: 2022-07-18

## 2022-04-18 NOTE — TELEPHONE ENCOUNTER
Refill was requested from pharmacy. Patient is up to date with appointments and lab work. Pt has an apt this week.

## 2022-04-20 NOTE — PROGRESS NOTES
[facility]   Cardiac Evaluation      Patient: Lazaro Strong  YOB: 1950  Date: 4/21/22       Chief Complaint   Patient presents with    Coronary Artery Disease    Hypertension    Cardiomyopathy    Hyperlipidemia        Referring provider: Veronica Ceron MD    History of Present Illness:   Mr Mahad Wilder is seen today for follow up. He was initially seen for re-evaluation of his coronary disease in 2012. He had  4 stents placed at [facility] in 2005. He states he did not have an MI, did not have chest pain. His symptoms at that time were fatigue. He was transferred to [facility] after having [facility]. Karan's other history includes hypertension and hyperlipidemia. He does not smoke or drink alcohol. June 29, 2015 Mr Mahad Wilder was on vacation. He developed chest pain. He was diagnosed with acute MI and an angiogram was performed with 3 stents placed to LCX and OM2. Angiogram 7/10/15 that revealed EF 40%, LAD - diffuse disease with long stented area in the prox and mid vessel with diffuse instent 40%, distal apical 90%, LCX - 40% ostial, 30% before LCX stent, 40% just prox to large PL branch, bifurcation stents in the LCX-OM3 junction which are patent. OM1 small with 90% ostial, RCA - dom, diffusely diseased, 50%prox, long stented area with localized 60% stenosis, 50% between stents, 2nd stent prox to PDA is patent, PDA - prox 50%. He then had a plain stress test for rehab and participated in rehab. January, 2018 he was admitted to [facility] after a grossly abnormal gxt. Cath performed then referred for CABG. Dr Virgilio Shepard operated 1/29/18 w/ CABG x 4 LIMA-LAD, SV-PDA, SV-Diag-OM2. Today, Mr Mahad Wilder states he feels fine other than when driving on the highway. He states when driving he feels lightheaded and feels his head \"squeezing\". He feels like he is going to pass out, but has not. He is fine driving as long as he is in the right jamshid.  Alejandra Lock has spoken with his pcp about this. Whit Brock has been taking Zoloft for 2 weeks but does not think it is helping. 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 . He denies chest pain, palpitations, RUIZ, dizziness, or edema. Past Medical History:   has a past medical history of CAD (coronary artery disease), Hyperlipidemia, Hypertension, Medical history reviewed with no changes, Medical history reviewed with no changes, Obstructive apnea, Swallowing difficulty, and Thyroid disease. Surgical History:   has a past surgical history that includes Coronary angioplasty with stent (2005, 2015); Elbow surgery (Right, 2001, 2004); Knee arthroscopy (Left, 2000); Plantar fascia surgery (Left, 01/15/2013); Colonoscopy (2011); Cardiac catheterization (01/24/2018); Coronary artery bypass graft; Lumbar spine surgery (Left, 5/15/2019); Lumbar spine surgery (Bilateral, 8/26/2019); Pain management procedure (Bilateral, 12/23/2019); Pain management procedure (Bilateral, 2/12/2020); Pain management procedure (Bilateral, 5/15/2020); Pain management procedure (N/A, 6/10/2020); and joint replacement (Bilateral). 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 Social History Main Topics    Smoking status: Never Smoker     Smokeless tobacco: Never Used    Alcohol Use: No    Drug Use:     Sexually Active: Other Topics Concern    Not on file     Social History Narrative    No narrative on file       Family History:  family history includes Alzheimer's Disease in his mother; Heart Failure in his mother; Liver Disease in his mother. Allergies:  Latex, Statins, and Niacin and related     Review of Systems:   · Constitutional: there has been no unanticipated weight loss.    · Eyes: No visual changes   · ENT: No Headaches, hearing loss or vertigo. No mouth sores or sore throat. · Cardiovascular: Reviewed in HPI  · Respiratory: No cough or wheezing, no sputum production. · Gastrointestinal: No abdominal pain, appetite loss, blood in stools. No change in bowel or bladder habits. · Genitourinary: No nocturia, dysuria, trouble voiding  · Musculoskeletal:  No gait disturbance, weakness or joint complaints. · Integumentary: No rash or pruritis. · Neurological: No headache, change in muscle strength, numbness or tingling. No change in gait, balance, coordination, mood, affect, memory, mentation, behavior. · Psychiatric: anxious   · Endocrine: No malaise or fever  · Hematologic/Lymphatic: No abnormal bruising or bleeding, blood clots or swollen lymph nodes. · Allergic/Immunologic: No nasal congestion or hives. Physical Examination:    Vitals:    04/21/22 0933   BP: 124/64   Site: Left Upper Arm   Position: Sitting   Cuff Size: Medium Adult   Pulse: 55   SpO2: 99%   Weight: 204 lb (92.5 kg)   Height: 5' 8\" (1.727 m)     Body mass index is 31.02 kg/m².      Wt Readings from Last 3 Encounters:   04/21/22 204 lb (92.5 kg)   06/09/21 211 lb (95.7 kg)   08/21/20 202 lb (91.6 kg)      BP Readings from Last 3 Encounters:   04/21/22 124/64   06/09/21 130/64   08/21/20 130/70      Constitutional and General Appearance:  appears stated age  Respiratory:  · Normal excursion and expansion without use of accessory muscles  · Resp Auscultation: Normal breath sounds without dullness  Cardiovascular:  · The apical impulses not displaced  · Heart is regular rate and rhythm with normal S1, S2  · The carotid upstroke is normal, no bruit noted   · JVP is not elevated  · Peripheral pulses are symmetrical  · There is no clubbing, cyanosis of the extremities  · 1+ edema BLE  · Femoral Arteries: 2+ and equal  · Pedal Pulses: 2+ and equal   Abdomen:  · No masses or tenderness  · Normal bowel sounds  Neurological/Psychiatric:  · Alert and oriented x3  · Moves all extremities well  · Exhibits normal gait balance and coordination      Assessment/Plan  1. Carotid stenosis: stable, doppler 7/12: <50% stenosis bilateral, repeat doppler 8/4/17: 0% bilateral   2. CAD (coronary artery disease): stable  GXT 8/15/20: ([facility]) done from the ER after presenting with dizziness. no reversible perfusion defect, fixed perfusion in inferior wall wall of left ventricle, suggestive of myocardial infarct in the expected territory of LCx  Echo 3/13/20:  EF 50-55%, mild hypokinesis of inferior walls. Grade 1 DD, mitral annular calcification is present. Trivial MR, aortic valve appears sclerotic but opens adequately. RVSP 30-35mmHg  CABG 1/29/18: CABG x 4 LIMA-LAD, SV-PDA, SV-Diag-OM2  Cath 1/24/18: LVEDP - 5. LVgram - mild inferior hypokinesis, EF 50%  Cors: LM - nl, LAD - proximal stents (2) with instent stenosis up to 95%, 70% between dx2 and dx3  DX1 - totalled with faint filling, DX2 moderate in caliber, RI - small with 90% ostial, LCX - 40% ostial, 50% between om1 and om2, mid stent and stent in OM2, 40% after mid stent, OM3 70%   RCA - dominant with 50% prox, long stented mid vessel with instent stenoses of 50-80%  Ff by 90% with another stent at RCA/PDA with instent restenosis of 95% Faint r-l collateral to the area of dx1  GXT 1/24/18: medium sized inferolateral fixed defect of moderate intensity consistent w/ infarction in territory of LCx and/or RCA. Small to medium sized anterior completely reversible defect of moderate intensity consistent with ischemia in territory of LAD. Small to medium sized inferolateral completely reversible defect of moderate intensity consistent with ischemia in Lcx and/or RCA. EF 47%. Referred to angiogram  Echo 4/28/17: EF 55%. There is inferior hypokinesis. Mild cLVH. Diastolic filling parameters suggests grade I diastolic dysfunction. Mild mitral annular calcification is present. Aortic valve appears sclerotic but opens adequately.  trivial mitral, pulmonic and tricuspid regurgitation with RVSP 25 mmHg. Cath 7/10/15: LVEDP - 16, EF 40%  Cors: LM - nl. LAD - diffuse disease with long stented area in the prox and mid vessel with diffuse instent 40%, distal apical 90%. LCX - 40% ostial, 30% before LCX stent, 40% just prox to large PL branch, bifurcation stents in the LCX-OM3 junction which are patent. OM1 small with 90% ostial  RCA - dom, diffusely diseased, 50%prox, long stented area with localized 60% stenosis, 50% between stents, 2nd stent prox to PDA is patent, PDA - prox 50%  FFR of RCA through all diseased segments was 0.85 ff 2 min of adenosine  Echo 6/2915 [facility]) [address], mildly reduced EF 50-55%, right ventricle mildly dilated, right atrium mildly dilated, left ventricular posterior hypokinesis  Cath 6/29/15 ([place]): right radial artery approach. Left main nl, LAD 30% proximal stent and apical 75%, Cx totally occluded. OM3 50%, RCA 30% in stent and distal 70%, PDA small to moderate with 80% proximal. No LV gram. Aspiration of Cx and OM with 2.5 x12 BERNIE in LCx, 2.75 x14 proximal to first stent in Cx, plaque shift to proximal OM with 2.75 x22 BERNIE to OM  stents x 4 at [facility] in 2005 (do not have records currently)  12/28/12 nuclear stress test> normal perfusion/EF, B/P elevated at rest & during peak exercise   3. Hypertension:   Stable   4. Hyperlipidemia: 9/17/20: ; TRIG 96; HDL 36; LDL 85, on statin, had aches on 80 mg Pravastatin. changed back to Crestor 10 mg, which he states he is tolerating   5. PVC's -palpitations, holter monitor 9/18: sinus rhythm, PVC's, PAC's. MCOT 11/27/2015 SB, no PVCs  6. Spells while driving which may be panic attacks. They are not cardiac in etiology. PLAN:   Shima Marin has been advised to call Dr Deion Cardoza and discuss Zoloft and the previous referral she gave him for psychiatrist. He appears stable from a cardiac standpoint. Repeat lipids and CMP. FU 6 months. Ligia's attestation:  This note was scribed in the presence of Dr Nubia Nieves MD by Sarah Tyson RN. .crn          Thank you for allowing to me to participate in the care of Madelin Becerra.

## 2022-04-21 ENCOUNTER — OFFICE VISIT (OUTPATIENT)
Dept: CARDIOLOGY CLINIC | Age: 72
End: 2022-04-21
Payer: COMMERCIAL

## 2022-04-21 VITALS
HEART RATE: 55 BPM | HEIGHT: 68 IN | BODY MASS INDEX: 30.92 KG/M2 | DIASTOLIC BLOOD PRESSURE: 64 MMHG | OXYGEN SATURATION: 99 % | WEIGHT: 204 LBS | SYSTOLIC BLOOD PRESSURE: 124 MMHG

## 2022-04-21 DIAGNOSIS — R00.2 HEART PALPITATIONS: ICD-10-CM

## 2022-04-21 DIAGNOSIS — E78.2 MIXED HYPERLIPIDEMIA: ICD-10-CM

## 2022-04-21 DIAGNOSIS — I25.10 CORONARY ARTERY DISEASE INVOLVING NATIVE CORONARY ARTERY OF NATIVE HEART WITHOUT ANGINA PECTORIS: Primary | ICD-10-CM

## 2022-04-21 DIAGNOSIS — I25.5 CARDIOMYOPATHY, ISCHEMIC: Chronic | ICD-10-CM

## 2022-04-21 PROCEDURE — 93000 ELECTROCARDIOGRAM COMPLETE: CPT | Performed by: INTERNAL MEDICINE

## 2022-04-21 PROCEDURE — 99214 OFFICE O/P EST MOD 30 MIN: CPT | Performed by: INTERNAL MEDICINE

## 2022-04-21 RX ORDER — SERTRALINE HYDROCHLORIDE 100 MG/1
100 TABLET, FILM COATED ORAL DAILY
COMMUNITY
Start: 2022-03-14 | End: 2022-10-24

## 2022-07-18 RX ORDER — CLOPIDOGREL BISULFATE 75 MG/1
TABLET ORAL
Qty: 90 TABLET | Refills: 1 | Status: SHIPPED | OUTPATIENT
Start: 2022-07-18

## 2022-07-18 NOTE — TELEPHONE ENCOUNTER
Refill was requested from pharmacy. Patient is up to date with appointments Pt reminded to get labs done.     4/21/2022 Last OV with [facility]

## 2022-09-14 RX ORDER — ROSUVASTATIN CALCIUM 10 MG/1
TABLET, COATED ORAL
Qty: 90 TABLET | Refills: 1 | Status: SHIPPED | OUTPATIENT
Start: 2022-09-14

## 2022-09-15 ENCOUNTER — HOSPITAL ENCOUNTER (OUTPATIENT)
Age: 72
Discharge: HOME | End: 2022-09-15
Payer: COMMERCIAL

## 2022-09-15 DIAGNOSIS — E78.2 MIXED HYPERLIPIDEMIA: ICD-10-CM

## 2022-09-15 LAB
A/G RATIO: 1.8 (ref 1.1–2.2)
ALBUMIN SERPL-MCNC: 4.4 G/DL (ref 3.4–5)
ALP BLD-CCNC: 67 U/L (ref 40–129)
ALT SERPL-CCNC: 13 U/L (ref 10–40)
ANION GAP SERPL CALCULATED.3IONS-SCNC: 9 MMOL/L (ref 3–16)
AST SERPL-CCNC: 16 U/L (ref 15–37)
BILIRUB SERPL-MCNC: 0.5 MG/DL (ref 0–1)
BUN BLDV-MCNC: 21 MG/DL (ref 7–20)
CALCIUM SERPL-MCNC: 9.2 MG/DL (ref 8.3–10.6)
CHLORIDE BLD-SCNC: 104 MMOL/L (ref 99–110)
CHOLESTEROL, TOTAL: 131 MG/DL (ref 0–199)
CO2: 24 MMOL/L (ref 21–32)
CREAT SERPL-MCNC: 1.2 MG/DL (ref 0.8–1.3)
GFR AFRICAN AMERICAN: >60
GFR NON-AFRICAN AMERICAN: 59
GLUCOSE BLD-MCNC: 94 MG/DL (ref 70–99)
HDLC SERPL-MCNC: 33 MG/DL (ref 40–60)
LDL CHOLESTEROL CALCULATED: 76 MG/DL
POTASSIUM SERPL-SCNC: 4.5 MMOL/L (ref 3.5–5.1)
SODIUM BLD-SCNC: 137 MMOL/L (ref 136–145)
TOTAL PROTEIN: 6.8 G/DL (ref 6.4–8.2)
TRIGL SERPL-MCNC: 112 MG/DL (ref 0–150)
VLDLC SERPL CALC-MCNC: 22 MG/DL

## 2022-09-15 PROCEDURE — 80061 LIPID PANEL: CPT

## 2022-09-15 PROCEDURE — 80053 COMPREHEN METABOLIC PANEL: CPT

## 2022-09-15 PROCEDURE — 36415 COLL VENOUS BLD VENIPUNCTURE: CPT

## 2022-10-14 NOTE — PROGRESS NOTES
[facility]   Cardiac Evaluation      Patient: Glenys Phillip  YOB: 1950  Date: 10/24/22       Chief Complaint   Patient presents with    Coronary Artery Disease    Hypertension    Hyperlipidemia          Referring provider: Batool Wilkins MD    History of Present Illness:   Mr Soheila Woodruff is seen today for follow up. He was initially seen for re-evaluation of his coronary disease in 2012. He had  4 stents placed at [facility] in 2005. He states he did not have an MI, did not have chest pain. His symptoms at that time were fatigue. He was transferred to [facility] after having an angiogram at [facility]. [facility] by Dr Tammy Aguilar. Karan's other history includes hypertension and hyperlipidemia. He does not smoke or drink alcohol. June 29, 2015 Mr Soheila Woodruff was on vacation. He developed chest pain. He was diagnosed with acute MI and an angiogram was performed with 3 stents placed to LCX and OM2. Angiogram 7/10/15 that revealed EF 40%, LAD - diffuse disease with long stented area in the prox and mid vessel with diffuse instent 40%, distal apical 90%, LCX - 40% ostial, 30% before LCX stent, 40% just prox to large PL branch, bifurcation stents in the LCX-OM3 junction which are patent. OM1 small with 90% ostial, RCA - dom, diffusely diseased, 50%prox, long stented area with localized 60% stenosis, 50% between stents, 2nd stent prox to PDA is patent, PDA - prox 50%. He then had a plain stress test for rehab and participated in rehab. January, 2018 he was admitted to [facility] after a grossly abnormal gxt. Cath performed then referred for CABG. Dr Toy Smith operated 1/29/18 w/ CABG x 4 LIMA-LAD, SV-PDA, SV-Diag-OM2. Today, Mr Soheila Wodoruff states he feels great now. He was having SOB and fatigue, but after synthroid dose adjusted he feels much better. 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 Magan Her denies chest pain, palpitations, RUIZ, dizziness, or edema.      Past Medical History:   has a past medical history of CAD (coronary artery disease), Hyperlipidemia, Hypertension, Medical history reviewed with no changes, Medical history reviewed with no changes, Obstructive apnea, Swallowing difficulty, and Thyroid disease. Surgical History:   has a past surgical history that includes Coronary angioplasty with stent (2005, 2015); Elbow surgery (Right, 2001, 2004); Knee arthroscopy (Left, 2000); Plantar fascia surgery (Left, 01/15/2013); Colonoscopy (2011); Cardiac catheterization (01/24/2018); Coronary artery bypass graft; Lumbar spine surgery (Left, 5/15/2019); Lumbar spine surgery (Bilateral, 8/26/2019); Pain management procedure (Bilateral, 12/23/2019); Pain management procedure (Bilateral, 2/12/2020); Pain management procedure (Bilateral, 5/15/2020); Pain management procedure (N/A, 6/10/2020); and joint replacement (Bilateral). 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 Social History Main Topics    Smoking status: Never Smoker     Smokeless tobacco: Never Used    Alcohol Use: No    Drug Use:     Sexually Active: Other Topics Concern    Not on file     Social History Narrative    No narrative on file       Family History:  family history includes Alzheimer's Disease in his mother; Heart Failure in his mother; Liver Disease in his mother. Allergies:  Latex, Statins, and Niacin and related     Review of Systems:   Constitutional: there has been no unanticipated weight loss. Eyes: No visual changes   ENT: No Headaches, hearing loss or vertigo. No mouth sores or sore throat. Cardiovascular: Reviewed in HPI  Respiratory: No cough or wheezing, no sputum production. Gastrointestinal: No abdominal pain, appetite loss, blood in stools. No change in bowel or bladder habits.   Genitourinary: No nocturia, from the ER after presenting with dizziness. no reversible perfusion defect, fixed perfusion in inferior wall wall of left ventricle, suggestive of myocardial infarct in the expected territory of LCx  Echo 3/13/20:  EF 50-55%, mild hypokinesis of inferior walls. Grade 1 DD, mitral annular calcification is present. Trivial MR, aortic valve appears sclerotic but opens adequately. RVSP 30-35mmHg  CABG 1/29/18: CABG x 4 LIMA-LAD, SV-PDA, SV-Diag-OM2  Cath 1/24/18: LVEDP - 5. LVgram - mild inferior hypokinesis, EF 50%  Cors: LM - nl, LAD - proximal stents (2) with instent stenosis up to 95%, 70% between dx2 and dx3  DX1 - totalled with faint filling, DX2 moderate in caliber, RI - small with 90% ostial, LCX - 40% ostial, 50% between om1 and om2, mid stent and stent in OM2, 40% after mid stent, OM3 70%   RCA - dominant with 50% prox, long stented mid vessel with instent stenoses of 50-80%  Ff by 90% with another stent at RCA/PDA with instent restenosis of 95% Faint r-l collateral to the area of dx1  GXT 1/24/18: medium sized inferolateral fixed defect of moderate intensity consistent w/ infarction in territory of LCx and/or RCA. Small to medium sized anterior completely reversible defect of moderate intensity consistent with ischemia in territory of LAD. Small to medium sized inferolateral completely reversible defect of moderate intensity consistent with ischemia in Lcx and/or RCA. EF 47%. Referred to angiogram  Echo 4/28/17: EF 55%. There is inferior hypokinesis. Mild cLVH. Diastolic filling parameters suggests grade I diastolic dysfunction. Mild mitral annular calcification is present. Aortic valve appears sclerotic but opens adequately. trivial mitral, pulmonic and tricuspid regurgitation with RVSP 25 mmHg. Cath 7/10/15: LVEDP - 16, EF 40%  Cors: LM - nl. LAD - diffuse disease with long stented area in the prox and mid vessel with diffuse instent 40%, distal apical 90%.  LCX - 40% ostial, 30% before LCX stent, 40% just prox to large PL branch, bifurcation stents in the LCX-OM3 junction which are patent. OM1 small with 90% ostial  RCA - dom, diffusely diseased, 50%prox, long stented area with localized 60% stenosis, 50% between stents, 2nd stent prox to PDA is patent, PDA - prox 50%  FFR of RCA through all diseased segments was 0.85 ff 2 min of adenosine  Echo 6/2915 [facility]) [address], mildly reduced EF 50-55%, right ventricle mildly dilated, right atrium mildly dilated, left ventricular posterior hypokinesis  Cath 6/29/15 ([place]): right radial artery approach. Left main nl, LAD 30% proximal stent and apical 75%, Cx totally occluded. OM3 50%, RCA 30% in stent and distal 70%, PDA small to moderate with 80% proximal. No LV gram. Aspiration of Cx and OM with 2.5 x12 BERNIE in LCx, 2.75 x14 proximal to first stent in Cx, plaque shift to proximal OM with 2.75 x22 BERNIE to OM  stents x 4 at [facility] in 2005 (do not have records currently)  12/28/12 nuclear stress test> normal perfusion/EF, B/P elevated at rest & during peak exercise   3. Hypertension:  stable on recheck   4. Hyperlipidemia: 9/15/22: ; TRIG 112; HDL 33; LDL 76, on statin, had aches on 80 mg Pravastatin. changed back to Crestor 10 mg, which he states he is tolerating   5. PVC's -palpitations, holter monitor 9/18: sinus rhythm, PVC's, PAC's. MCOT 11/27/2015 SB, no PVCs  6. Spells while driving which may be panic attacks. They are not cardiac in etiology. He has had no more spells      PLAN:   Mr Wolf Ware appears stable. No med changes. Encouraged to continue with regular exercise. FU 6 months. Scribe's attestation: This note was scribed in the presence of Dr Sara Painter MD by Rian Mojica RN. The scribe's documentation has been prepared under my direction and personally reviewed by me in its entirety. I confirm that the note above accurately reflects all work, treatment, procedures, and medical decision making performed by me.          Thank you for allowing to me to participate in the care of Meryle Caro.

## 2022-10-24 ENCOUNTER — OFFICE VISIT (OUTPATIENT)
Dept: CARDIOLOGY CLINIC | Age: 72
End: 2022-10-24
Payer: COMMERCIAL

## 2022-10-24 VITALS
SYSTOLIC BLOOD PRESSURE: 138 MMHG | HEIGHT: 68 IN | WEIGHT: 211 LBS | BODY MASS INDEX: 31.98 KG/M2 | DIASTOLIC BLOOD PRESSURE: 84 MMHG | HEART RATE: 56 BPM | OXYGEN SATURATION: 98 %

## 2022-10-24 DIAGNOSIS — I10 ESSENTIAL HYPERTENSION, BENIGN: ICD-10-CM

## 2022-10-24 DIAGNOSIS — R00.2 HEART PALPITATIONS: ICD-10-CM

## 2022-10-24 DIAGNOSIS — E78.2 MIXED HYPERLIPIDEMIA: ICD-10-CM

## 2022-10-24 DIAGNOSIS — I25.10 CORONARY ARTERY DISEASE INVOLVING NATIVE CORONARY ARTERY OF NATIVE HEART WITHOUT ANGINA PECTORIS: Primary | ICD-10-CM

## 2022-10-24 PROCEDURE — 99214 OFFICE O/P EST MOD 30 MIN: CPT | Performed by: INTERNAL MEDICINE

## 2022-10-24 PROCEDURE — 1123F ACP DISCUSS/DSCN MKR DOCD: CPT | Performed by: INTERNAL MEDICINE

## 2023-01-09 RX ORDER — CARVEDILOL 3.12 MG/1
TABLET ORAL
Qty: 180 TABLET | Refills: 3 | Status: SHIPPED | OUTPATIENT
Start: 2023-01-09

## 2023-01-19 NOTE — TELEPHONE ENCOUNTER
Refill was requested from pharmacy. Patient is up to date with appointments and lab work.       10/24/2022 Ov with [facility]    5/18/2023 [facility]    Labs done 9/15/2022

## 2023-01-20 RX ORDER — CLOPIDOGREL BISULFATE 75 MG/1
TABLET ORAL
Qty: 90 TABLET | Refills: 3 | Status: SHIPPED | OUTPATIENT
Start: 2023-01-20

## 2023-03-07 ENCOUNTER — OFFICE VISIT (OUTPATIENT)
Dept: PULMONOLOGY | Age: 73
End: 2023-03-07
Payer: COMMERCIAL

## 2023-03-07 VITALS
SYSTOLIC BLOOD PRESSURE: 134 MMHG | HEART RATE: 60 BPM | OXYGEN SATURATION: 94 % | HEIGHT: 68 IN | DIASTOLIC BLOOD PRESSURE: 84 MMHG | BODY MASS INDEX: 32.28 KG/M2 | WEIGHT: 213 LBS

## 2023-03-07 DIAGNOSIS — I25.5 CARDIOMYOPATHY, ISCHEMIC: Chronic | ICD-10-CM

## 2023-03-07 DIAGNOSIS — I25.10 CORONARY ARTERY DISEASE INVOLVING NATIVE CORONARY ARTERY OF NATIVE HEART WITHOUT ANGINA PECTORIS: Chronic | ICD-10-CM

## 2023-03-07 DIAGNOSIS — G47.33 OBSTRUCTIVE SLEEP APNEA (ADULT) (PEDIATRIC): Chronic | ICD-10-CM

## 2023-03-07 PROCEDURE — 99214 OFFICE O/P EST MOD 30 MIN: CPT | Performed by: INTERNAL MEDICINE

## 2023-03-07 PROCEDURE — 1123F ACP DISCUSS/DSCN MKR DOCD: CPT | Performed by: INTERNAL MEDICINE

## 2023-03-07 PROCEDURE — 3075F SYST BP GE 130 - 139MM HG: CPT | Performed by: INTERNAL MEDICINE

## 2023-03-07 PROCEDURE — 3079F DIAST BP 80-89 MM HG: CPT | Performed by: INTERNAL MEDICINE

## 2023-03-07 ASSESSMENT — SLEEP AND FATIGUE QUESTIONNAIRES
HOW LIKELY ARE YOU TO NOD OFF OR FALL ASLEEP WHILE SITTING AND TALKING TO SOMEONE: 0
HOW LIKELY ARE YOU TO NOD OFF OR FALL ASLEEP IN A CAR, WHILE STOPPED FOR A FEW MINUTES IN TRAFFIC: 0
HOW LIKELY ARE YOU TO NOD OFF OR FALL ASLEEP WHILE SITTING INACTIVE IN A PUBLIC PLACE: 0
ESS TOTAL SCORE: 0
HOW LIKELY ARE YOU TO NOD OFF OR FALL ASLEEP WHILE LYING DOWN TO REST IN THE AFTERNOON WHEN CIRCUMSTANCES PERMIT: 0
HOW LIKELY ARE YOU TO NOD OFF OR FALL ASLEEP WHILE SITTING QUIETLY AFTER LUNCH WITHOUT ALCOHOL: 0
HOW LIKELY ARE YOU TO NOD OFF OR FALL ASLEEP WHILE SITTING AND READING: 0
HOW LIKELY ARE YOU TO NOD OFF OR FALL ASLEEP WHILE WATCHING TV: 0
HOW LIKELY ARE YOU TO NOD OFF OR FALL ASLEEP WHEN YOU ARE A PASSENGER IN A CAR FOR AN HOUR WITHOUT A BREAK: 0

## 2023-03-07 NOTE — PROGRESS NOTES
Alondra Brady         : 1950    Diagnosis: [x] ENA (G47.33) [] CSA (G47.31) [] Apnea (G47.30)   Length of Need: [x] 18 Months [] 99 Months [] Other:    Machine (GERRY!): [] Respironics Dream Station      Auto [] ResMed AirSense     Auto [] Other:     []  CPAP () [] Bilevel ()   Mode: [] Auto [] Spontaneous    Mode: [] Auto [] Spontaneous            Comfort Settings:        Humidifier: [] Heated ()        [x] Water chamber replacement ()/ 1 per 6 months        Mask:   [x] Nasal () /1 per 3 months [] Full Face () /1 per 3 months   [x] Patient choice -Size and fit mask [] Patient Choice - Size and fit mask   [] Dispense:  [] Dispense:    [x] Headgear () / 1 per 3 months [] Headgear () / 1 per 3 months   [x] Replacement Nasal Cushion ()/2 per month [] Interface Replacement ()/1 per month   [x] Replacement Nasal Pillows ()/2 per month         Tubing: [x] Heated ()/1 per 3 months    [] Standard ()/1 per 3 months [] Other:           Filters: [x] Non-disposable ()/1 per 6 months     [x] Ultra-Fine, Disposable ()/2 per month        Miscellaneous: [] Chin Strap ()/ 1 per 6 months [] O2 bleed-in:       LPM   [] Oximetry on CPAP/Bilevel []  Other:    [x] Modem: ()         Start Order Date: 23    MEDICAL JUSTIFICATION:  I, the undersigned, certify that the above prescribed supplies are medically necessary for this patients wellbeing. In my opinion, the supplies are both reasonable and necessary in reference to accepted standards of medicalpractice in treatment of this patients condition.     Arely Galloway MD      NPI: [id number]        Order Signed Date: 23    Electronically signed by Arely Galloway MD on 3/7/2023 at 3:31 PM    Alondra Brady  1950  [address] [facility] [address]  [phone] (home)   [phone] (mobile)      Insurance Info (confirm with patient if correct):  Payer/Plan Subscr  Sex Relation Sub.  Ins. ID Effective Group Num

## 2023-03-07 NOTE — ASSESSMENT & PLAN NOTE
Chronic-Stable: Reviewed and analyzed results of physiologic download from patient's machine and reviewed with patient. Supplies and parts as needed for his machine. These are medically necessary. Limit caffeine use after 3pm. Based on the analyzed data will change following settings: EPAPmin-7  PSmin-2 to see if that helps with mild increase in his AHI    Discussed with the patient about the recall that he is really continue to use his machine until he gets his replacement.

## 2023-03-07 NOTE — LETTER
March 7, 2023      Denise Brantley [facility] [id number]      Patient: Warren Prasad   MR Number: [id number]   YOB: 1950   Date of Visit: 3/7/2023       Dear Denise Brantley: Thank you for referring Mago Dickinson to me for evaluation/treatment. Below are the relevant portions of my assessment and plan of care. 1. Obstructive sleep apnea (adult) (pediatric)  Assessment & Plan:  Chronic-Stable: Reviewed and analyzed results of physiologic download from patient's machine and reviewed with patient. Supplies and parts as needed for his machine. These are medically necessary. Limit caffeine use after 3pm. Based on the analyzed data will change following settings: EPAPmin-7  PSmin-2 to see if that helps with mild increase in his AHI    Discussed with the patient about the recall that he is really continue to use his machine until he gets his replacement. 2. Coronary artery disease involving native coronary artery of native heart without angina pectoris  Assessment & Plan:  Chronic- Stable. Discussed the importance of treating sleep apnea as part of the management of this disorder. Cont any meds per PCP and other physicians. 3. Cardiomyopathy, ischemic  Assessment & Plan:  Chronic- Stable. Discussed the importance of treating sleep apnea as part of the management of this disorder. Cont any meds per PCP and other physicians. Reviewed, analyzed, and documented physiologic data from patient's PAP machine. This information was analyzed to assess complexity and medical decision making in regards to further testing and management. Diagnoses of Obstructive sleep apnea (adult) (pediatric), Coronary artery disease involving native coronary artery of native heart without angina pectoris, and Cardiomyopathy, ischemic were pertinent to this visit.   The chronic medical conditions listed are directly related to the primary diagnosis listed above.  The management of the primary diagnosis affects the secondary diagnosis and vice versa. If you have questions, please do not hesitate to call me. I look forward to following Julián Hernández along with you.     Sincerely,        Nabor Hernandez MD

## 2023-03-07 NOTE — PROGRESS NOTES
Creola Sabot MD Luan Brittle CNP  Sonya Paulashola CNP [place]  [address] [place]  [address] [facility], [address] [facility] [phone]   Hilario Goodpasture  [address]  [place], [address] [place] [address] [phone] [place] [address]  [phone]      Assessment/Plan:      1. Obstructive sleep apnea (adult) (pediatric)  Assessment & Plan:  Chronic-Stable: Reviewed and analyzed results of physiologic download from patient's machine and reviewed with patient. Supplies and parts as needed for his machine. These are medically necessary. Limit caffeine use after 3pm. Based on the analyzed data will change following settings: EPAPmin-7  PSmin-2 to see if that helps with mild increase in his AHI    Discussed with the patient about the recall that he is really continue to use his machine until he gets his replacement. 2. Coronary artery disease involving native coronary artery of native heart without angina pectoris  Assessment & Plan:  Chronic- Stable. Discussed the importance of treating sleep apnea as part of the management of this disorder. Cont any meds per PCP and other physicians. 3. Cardiomyopathy, ischemic  Assessment & Plan:  Chronic- Stable. Discussed the importance of treating sleep apnea as part of the management of this disorder. Cont any meds per PCP and other physicians. Reviewed, analyzed, and documented physiologic data from patient's PAP machine. This information was analyzed to assess complexity and medical decision making in regards to further testing and management. Diagnoses of Obstructive sleep apnea (adult) (pediatric), Coronary artery disease involving native coronary artery of native heart without angina pectoris, and Cardiomyopathy, ischemic were pertinent to this visit.   The chronic medical conditions listed are directly related to the primary diagnosis listed above. The management of the primary diagnosis affects the secondary diagnosis and vice versa. Subjective:   Subjective   Patient ID: Jesus Manuel Dale is a 67 y.o. male. Chief Complaint   Patient presents with    Sleep Apnea     Not using due to recall information       HPI:  Machine Modem/Download Info:  Compliance (hours/night): 5.5 hrs/night  % of nights >= 4 hrs: 78.9 %  Download AHI (/hour): 8.6 /HR  Average IPAP Pressure: 10.1 cmH2O  Average EPAP Pressure: 8 cmH2O   AUTO BIPAP - Settings (Raquel)  IPAP Max: 25 cmH2O  EPAP Min: 6 cmH2O  Pressure Support Min: 2  Pressure Support Max: 5             Comfort Settings  Humidity Level (0-8): 4  Flex/EPR (0-3): 3       Was doing well with his machine but then got concerned about the recall machine and then using his hearing from orders about possible causes of cancer. We confirmed that there is 0 data that the foam in the recall has caused any cancer issues and that the patient should continue to use his machine. His download still shows mild elevation in his AHI which has been stable for some time. When he was using his machine the pressure felt good and he was sleeping well.     [facility]    Glidden - Glidden Sleepiness Score: 0    Social History     Socioeconomic History    Marital status:      Spouse name: Not on file    Number of children: Not on file    Years of education: Not on file    Highest education level: Not on file   Occupational History    Not on file   Tobacco Use    Smoking status: Never    Smokeless tobacco: Never   Vaping Use    Vaping Use: Never used   Substance and Sexual Activity    Alcohol use: No    Drug use: Never    Sexual activity: Not on file   Other Topics Concern    Not on file   Social History Narrative    Not on file     Social Determinants of Health     Financial Resource Strain: Not on file   Food Insecurity: Not on file   Transportation Needs: Not on file   Physical Activity: Not on file Stress: Not on file   Social Connections: Not on file   Intimate Partner Violence: Not on file   Housing Stability: Not on file       Current Outpatient Medications   Medication Instructions    aspirin 81 mg, Oral, DAILY    carvedilol (COREG) 3.125 MG tablet TAKE ONE TABLET BY MOUTH TWICE A DAY WITH MEALS    clopidogrel (PLAVIX) 75 MG tablet TAKE ONE TABLET BY MOUTH DAILY    levothyroxine (SYNTHROID) 150 mcg, Oral, DAILY    losartan (COZAAR) 50 mg, Oral, DAILY    rosuvastatin (CRESTOR) 10 MG tablet TAKE ONE TABLET BY MOUTH DAILY          Vitals:  Weight BMI   Wt Readings from Last 3 Encounters:   03/07/23 213 lb (96.6 kg)   10/24/22 211 lb (95.7 kg)   04/21/22 204 lb (92.5 kg)    Body mass index is 32.39 kg/m².      BP HR SaO2   BP Readings from Last 3 Encounters:   03/07/23 134/84   10/24/22 138/84   04/21/22 124/64    Pulse Readings from Last 3 Encounters:   03/07/23 60   10/24/22 56   04/21/22 55    SpO2 Readings from Last 3 Encounters:   03/07/23 94%   10/24/22 98%   04/21/22 99%        Electronically signed by Joao Alvarez MD on 3/7/2023 at 3:31 PM

## 2023-03-07 NOTE — PROGRESS NOTES
Tami Baird         : 1950    Diagnosis: [x] ENA (G47.33) [] CSA (G47.31) [] Apnea (G47.30)   Length of Need: [] 18 Months [x] 99 Months [] Other:    Machine (GERRY!): [x] Respironics Dream Station  Auto [] ResMed AirSense     Auto S11 [] Other:     []  CPAP () [x] Bilevel ()   Mode: [] Auto [] Spontaneous    Mode: [x] Auto [] Spontaneous       IPAPmax-25  EPAPmin-6  PSmin-2  PSmax-5     Comfort Settings:   - Ramp Pressure: 5 cmH2O                                        - Ramp time: 15 min                                     -  Flex/EPR - 3 full time                                    - For ResMed Bilevel (TiMax-4 sec   TiMin- 0.2 sec)     Humidifier: [x] Heated ()        [x] Water chamber replacement ()/ 1 per 6 months        Mask:   [] Nasal () /1 per 3 months [] Full Face () /1 per 3 months   [] Patient choice -Size and fit mask [] Patient Choice - Size and fit mask   [] Dispense:  [] Dispense:    [] Headgear () / 1 per 3 months [] Headgear () / 1 per 3 months   [] Replacement Nasal Cushion ()/2 per month [] Interface Replacement ()/1 per month   [] Replacement Nasal Pillows ()/2 per month         Tubing: [x] Heated ()/1 per 3 months    [] Standard ()/1 per 3 months [] Other:           Filters: [x] Non-disposable ()/1 per 6 months     [x] Ultra-Fine, Disposable ()/2 per month        Miscellaneous: [] Chin Strap ()/ 1 per 6 months [] O2 bleed-in:       LPM   [] Oximetry on CPAP/Bilevel []  Other:    [x] Modem: ()         Start Order Date: 23    MEDICAL JUSTIFICATION:  I, the undersigned, certify that the above prescribed supplies are medically necessary for this patients wellbeing. In my opinion, the supplies are both reasonable and necessary in reference to accepted standards of medicalpractice in treatment of this patients condition.     Lukasz Malik MD      NPI: [id number]       Order Signed Date: 23    Electronically signed by Ramos Huff MD on 3/7/2023 at 3:13 PM    Carmelo Victor  1950  [address] [place] [address]  [phone] (home)   [phone] (mobile)      Insurance Info (confirm with patient if correct):  Payer/Plan Subscr  Sex Relation Sub.  Ins. ID Effective Group Num

## 2023-03-13 RX ORDER — ROSUVASTATIN CALCIUM 10 MG/1
TABLET, COATED ORAL
Qty: 90 TABLET | Refills: 1 | Status: SHIPPED | OUTPATIENT
Start: 2023-03-13

## 2023-05-17 NOTE — PROGRESS NOTES
[address]   Cardiac Evaluation      Patient: Desiree Faye  YOB: 1950  Date: 5/18/23       Chief Complaint   Patient presents with    Cardiomyopathy    Hypertension    Hyperlipidemia          Referring provider: Whitfield Felty, MD    History of Present Illness:   Mr Carlos Padilla is seen today for follow up. He was initially seen for re-evaluation of his coronary disease in 2012. He had  4 stents placed at [facility] in 2005. He states he did not have an MI, did not have chest pain. His symptoms at that time were fatigue. He was transferred to [facility] after having an angiogram at [facility] by Dr Brianna Linda. Karan's other history includes hypertension and hyperlipidemia. He does not smoke or drink alcohol. June 29, 2015 Mr Carlos Padilla was on vacation. He developed chest pain. He was diagnosed with acute MI and an angiogram was performed with 3 stents placed to LCX and OM2. Angiogram 7/10/15 that revealed EF 40%, LAD - diffuse disease with long stented area in the prox and mid vessel with diffuse instent 40%, distal apical 90%, LCX - 40% ostial, 30% before LCX stent, 40% just prox to large PL branch, bifurcation stents in the LCX-OM3 junction which are patent. OM1 small with 90% ostial, RCA - dom, diffusely diseased, 50%prox, long stented area with localized 60% stenosis, 50% between stents, 2nd stent prox to PDA is patent, PDA - prox 50%. He then had a plain stress test for rehab and participated in rehab. January, 2018 he was admitted to [facility] after a grossly abnormal gxt. Cath performed then referred for CABG. Dr Brigido Worley operated 1/29/18 w/ CABG x 4 LIMA-LAD, SV-PDA, SV-Diag-OM2. Today, Mr Carlos Padilla states he has been well. He denies chest pain, palpitations, RUIZ, dizziness, or edema. 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      Past Medical History:   has a past medical history of CAD (coronary artery disease), Hyperlipidemia, Hypertension, Medical history reviewed with

## 2023-05-18 ENCOUNTER — OFFICE VISIT (OUTPATIENT)
Dept: CARDIOLOGY CLINIC | Age: 73
End: 2023-05-18
Payer: MEDICARE

## 2023-05-18 VITALS
OXYGEN SATURATION: 98 % | HEIGHT: 68 IN | DIASTOLIC BLOOD PRESSURE: 70 MMHG | BODY MASS INDEX: 32.28 KG/M2 | HEART RATE: 54 BPM | WEIGHT: 213 LBS | SYSTOLIC BLOOD PRESSURE: 130 MMHG

## 2023-05-18 DIAGNOSIS — I10 ESSENTIAL HYPERTENSION, BENIGN: ICD-10-CM

## 2023-05-18 DIAGNOSIS — E78.2 MIXED HYPERLIPIDEMIA: ICD-10-CM

## 2023-05-18 DIAGNOSIS — I25.10 CORONARY ARTERY DISEASE INVOLVING NATIVE CORONARY ARTERY OF NATIVE HEART WITHOUT ANGINA PECTORIS: Primary | Chronic | ICD-10-CM

## 2023-05-18 PROCEDURE — 3074F SYST BP LT 130 MM HG: CPT | Performed by: INTERNAL MEDICINE

## 2023-05-18 PROCEDURE — 3078F DIAST BP <80 MM HG: CPT | Performed by: INTERNAL MEDICINE

## 2023-05-18 PROCEDURE — 99214 OFFICE O/P EST MOD 30 MIN: CPT | Performed by: INTERNAL MEDICINE

## 2023-05-18 PROCEDURE — 93000 ELECTROCARDIOGRAM COMPLETE: CPT | Performed by: INTERNAL MEDICINE

## 2023-05-18 PROCEDURE — 1123F ACP DISCUSS/DSCN MKR DOCD: CPT | Performed by: INTERNAL MEDICINE

## 2023-09-09 DIAGNOSIS — E78.2 MIXED HYPERLIPIDEMIA: Primary | ICD-10-CM

## 2023-09-11 ENCOUNTER — OFFICE VISIT (OUTPATIENT)
Dept: PULMONOLOGY | Age: 73
End: 2023-09-11
Payer: MEDICARE

## 2023-09-11 VITALS
HEART RATE: 58 BPM | HEIGHT: 68 IN | SYSTOLIC BLOOD PRESSURE: 118 MMHG | DIASTOLIC BLOOD PRESSURE: 62 MMHG | WEIGHT: 215.2 LBS | OXYGEN SATURATION: 97 % | BODY MASS INDEX: 32.61 KG/M2

## 2023-09-11 DIAGNOSIS — G47.33 OBSTRUCTIVE SLEEP APNEA (ADULT) (PEDIATRIC): Chronic | ICD-10-CM

## 2023-09-11 DIAGNOSIS — I10 ESSENTIAL HYPERTENSION, BENIGN: Chronic | ICD-10-CM

## 2023-09-11 DIAGNOSIS — I25.10 CORONARY ARTERY DISEASE INVOLVING NATIVE CORONARY ARTERY OF NATIVE HEART WITHOUT ANGINA PECTORIS: Chronic | ICD-10-CM

## 2023-09-11 DIAGNOSIS — I25.5 CARDIOMYOPATHY, ISCHEMIC: Chronic | ICD-10-CM

## 2023-09-11 PROCEDURE — 99214 OFFICE O/P EST MOD 30 MIN: CPT | Performed by: INTERNAL MEDICINE

## 2023-09-11 PROCEDURE — 1123F ACP DISCUSS/DSCN MKR DOCD: CPT | Performed by: INTERNAL MEDICINE

## 2023-09-11 PROCEDURE — 3074F SYST BP LT 130 MM HG: CPT | Performed by: INTERNAL MEDICINE

## 2023-09-11 PROCEDURE — 3078F DIAST BP <80 MM HG: CPT | Performed by: INTERNAL MEDICINE

## 2023-09-11 RX ORDER — ROSUVASTATIN CALCIUM 10 MG/1
TABLET, COATED ORAL
Qty: 90 TABLET | Refills: 3 | Status: SHIPPED | OUTPATIENT
Start: 2023-09-11

## 2023-09-11 ASSESSMENT — SLEEP AND FATIGUE QUESTIONNAIRES
HOW LIKELY ARE YOU TO NOD OFF OR FALL ASLEEP IN A CAR, WHILE STOPPED FOR A FEW MINUTES IN TRAFFIC: 0
HOW LIKELY ARE YOU TO NOD OFF OR FALL ASLEEP WHILE SITTING QUIETLY AFTER LUNCH WITHOUT ALCOHOL: 0
HOW LIKELY ARE YOU TO NOD OFF OR FALL ASLEEP WHILE LYING DOWN TO REST IN THE AFTERNOON WHEN CIRCUMSTANCES PERMIT: 1
HOW LIKELY ARE YOU TO NOD OFF OR FALL ASLEEP WHILE SITTING INACTIVE IN A PUBLIC PLACE: 0
HOW LIKELY ARE YOU TO NOD OFF OR FALL ASLEEP WHEN YOU ARE A PASSENGER IN A CAR FOR AN HOUR WITHOUT A BREAK: 0
HOW LIKELY ARE YOU TO NOD OFF OR FALL ASLEEP WHILE SITTING AND READING: 0
HOW LIKELY ARE YOU TO NOD OFF OR FALL ASLEEP WHILE SITTING AND TALKING TO SOMEONE: 0
ESS TOTAL SCORE: 1
HOW LIKELY ARE YOU TO NOD OFF OR FALL ASLEEP WHILE WATCHING TV: 0

## 2023-09-11 NOTE — PROGRESS NOTES
Sherrill Croft MD Lauran Cushing CNP Mayumi Hiraide CNP [facility] [address] [facility], [address] [facility] [phone]   [facility] Taco CURRY  [address] [phone] [place] [address] [facility] [address]  Dept: [phone]  Dept Fax: [phone]  Loc: [phone]      Assessment/Plan:      1. Obstructive sleep apnea (adult) (pediatric)  Assessment & Plan:  Chronic-Stable: Reviewed and analyzed results of physiologic download from patient's machine and reviewed with patient. Supplies and parts as needed for his machine. These are medically necessary. Limit caffeine use after 3pm. Based on the analyzed data will continue with current settings     2. Coronary artery disease involving native coronary artery of native heart without angina pectoris  Assessment & Plan:  Chronic- Stable. Discussed the importance of treating sleep apnea as part of the management of this disorder. Cont any meds per PCP and other physicians. 3. Cardiomyopathy, ischemic  Assessment & Plan:  Chronic- Stable. Discussed the importance of treating sleep apnea as part of the management of this disorder. Cont any meds per PCP and other physicians. 4. Essential hypertension, benign  Assessment & Plan:  Chronic- Stable. Discussed the importance of treating sleep apnea as part of the management of this disorder. Cont any meds per PCP and other physicians. Reviewed, analyzed, and documented physiologic data from patient's PAP machine. This information was analyzed to assess complexity and medical decision making in regards to further testing and management.     Diagnoses of Obstructive sleep apnea (adult) (pediatric), Coronary artery disease involving native coronary artery of native heart without angina pectoris, Cardiomyopathy, ischemic, and Essential hypertension, benign were

## 2023-09-12 ENCOUNTER — HOSPITAL ENCOUNTER (OUTPATIENT)
Age: 73
Discharge: HOME | End: 2023-09-12
Payer: MEDICARE

## 2023-09-12 DIAGNOSIS — E78.2 MIXED HYPERLIPIDEMIA: ICD-10-CM

## 2023-09-12 DIAGNOSIS — I25.5 ISCHEMIC CARDIOMYOPATHY: Primary | Chronic | ICD-10-CM

## 2023-09-12 DIAGNOSIS — I10 ESSENTIAL HYPERTENSION, BENIGN: Chronic | ICD-10-CM

## 2023-09-12 LAB
ALBUMIN SERPL-MCNC: 4.3 G/DL (ref 3.4–5)
ALBUMIN/GLOB SERPL: 1.7 {RATIO} (ref 1.1–2.2)
ALP SERPL-CCNC: 55 U/L (ref 40–129)
ALT SERPL-CCNC: 14 U/L (ref 10–40)
ANION GAP SERPL CALCULATED.3IONS-SCNC: 8 MMOL/L (ref 3–16)
AST SERPL-CCNC: 16 U/L (ref 15–37)
BILIRUB SERPL-MCNC: 0.5 MG/DL (ref 0–1)
BUN SERPL-MCNC: 17 MG/DL (ref 7–20)
CALCIUM SERPL-MCNC: 9.9 MG/DL (ref 8.3–10.6)
CHLORIDE SERPL-SCNC: 104 MMOL/L (ref 99–110)
CHOLEST SERPL-MCNC: 143 MG/DL (ref 0–199)
CO2 SERPL-SCNC: 27 MMOL/L (ref 21–32)
CREAT SERPL-MCNC: 1.6 MG/DL (ref 0.8–1.3)
GFR SERPLBLD CREATININE-BSD FMLA CKD-EPI: 45 ML/MIN/{1.73_M2}
GLUCOSE SERPL-MCNC: 103 MG/DL (ref 70–99)
HDLC SERPL-MCNC: 31 MG/DL (ref 40–60)
LDLC SERPL CALC-MCNC: 81 MG/DL
POTASSIUM SERPL-SCNC: 4.9 MMOL/L (ref 3.5–5.1)
PROT SERPL-MCNC: 6.9 G/DL (ref 6.4–8.2)
SODIUM SERPL-SCNC: 139 MMOL/L (ref 136–145)
TRIGL SERPL-MCNC: 157 MG/DL (ref 0–150)
VLDLC SERPL CALC-MCNC: 31 MG/DL

## 2023-09-12 PROCEDURE — 36415 COLL VENOUS BLD VENIPUNCTURE: CPT

## 2023-09-12 PROCEDURE — 80053 COMPREHEN METABOLIC PANEL: CPT

## 2023-09-12 PROCEDURE — 80061 LIPID PANEL: CPT

## 2023-09-25 ENCOUNTER — OFFICE VISIT (OUTPATIENT)
Dept: CARDIOLOGY CLINIC | Age: 73
End: 2023-09-25
Payer: MEDICARE

## 2023-09-25 VITALS
BODY MASS INDEX: 32.28 KG/M2 | HEIGHT: 68 IN | OXYGEN SATURATION: 96 % | DIASTOLIC BLOOD PRESSURE: 70 MMHG | HEART RATE: 62 BPM | WEIGHT: 213 LBS | SYSTOLIC BLOOD PRESSURE: 122 MMHG

## 2023-09-25 DIAGNOSIS — I25.10 CORONARY ARTERY DISEASE INVOLVING NATIVE CORONARY ARTERY OF NATIVE HEART WITHOUT ANGINA PECTORIS: Primary | ICD-10-CM

## 2023-09-25 DIAGNOSIS — E78.2 MIXED HYPERLIPIDEMIA: ICD-10-CM

## 2023-09-25 DIAGNOSIS — I10 ESSENTIAL HYPERTENSION, BENIGN: ICD-10-CM

## 2023-09-25 PROCEDURE — 3074F SYST BP LT 130 MM HG: CPT | Performed by: INTERNAL MEDICINE

## 2023-09-25 PROCEDURE — 3078F DIAST BP <80 MM HG: CPT | Performed by: INTERNAL MEDICINE

## 2023-09-25 PROCEDURE — 99214 OFFICE O/P EST MOD 30 MIN: CPT | Performed by: INTERNAL MEDICINE

## 2023-09-25 PROCEDURE — 1123F ACP DISCUSS/DSCN MKR DOCD: CPT | Performed by: INTERNAL MEDICINE

## 2023-09-25 RX ORDER — LOSARTAN POTASSIUM 25 MG/1
25 TABLET ORAL DAILY
Qty: 90 TABLET | Refills: 3 | Status: SHIPPED | OUTPATIENT
Start: 2023-09-25

## 2023-09-25 RX ORDER — ROSUVASTATIN CALCIUM 20 MG/1
20 TABLET, COATED ORAL DAILY
Qty: 90 TABLET | Refills: 3 | Status: SHIPPED | OUTPATIENT
Start: 2023-09-25

## 2023-09-25 NOTE — PROGRESS NOTES
3/13/20:  EF 50-55%, mild hypokinesis of inferior walls. Grade 1 DD, mitral annular calcification is present. Trivial MR, aortic valve appears sclerotic but opens adequately. RVSP 30-35mmHg  CABG 1/29/18: CABG x 4 LIMA-LAD, SV-PDA, SV-Diag-OM2  Cath 1/24/18: LVEDP - 5. LVgram - mild inferior hypokinesis, EF 50%  Cors: LM - nl, LAD - proximal stents (2) with instent stenosis up to 95%, 70% between dx2 and dx3  DX1 - totalled with faint filling, DX2 moderate in caliber, RI - small with 90% ostial, LCX - 40% ostial, 50% between om1 and om2, mid stent and stent in OM2, 40% after mid stent, OM3 70%   RCA - dominant with 50% prox, long stented mid vessel with instent stenoses of 50-80%  Ff by 90% with another stent at RCA/PDA with instent restenosis of 95% Faint r-l collateral to the area of dx1  GXT 1/24/18: medium sized inferolateral fixed defect of moderate intensity consistent w/ infarction in territory of LCx and/or RCA. Small to medium sized anterior completely reversible defect of moderate intensity consistent with ischemia in territory of LAD. Small to medium sized inferolateral completely reversible defect of moderate intensity consistent with ischemia in Lcx and/or RCA. EF 47%. Referred to angiogram  Echo 4/28/17: EF 55%. There is inferior hypokinesis. Mild cLVH. Diastolic filling parameters suggests grade I diastolic dysfunction. Mild mitral annular calcification is present. Aortic valve appears sclerotic but opens adequately. trivial mitral, pulmonic and tricuspid regurgitation with RVSP 25 mmHg. Cath 7/10/15: LVEDP - 16, EF 40%  Cors: LM - nl. LAD - diffuse disease with long stented area in the prox and mid vessel with diffuse instent 40%, distal apical 90%. LCX - 40% ostial, 30% before LCX stent, 40% just prox to large PL branch, bifurcation stents in the LCX-OM3 junction which are patent.  OM1 small with 90% ostial  RCA - dom, diffusely diseased, 50%prox, long stented area with localized 60% stenosis, 50%

## 2024-01-03 RX ORDER — CARVEDILOL 3.12 MG/1
TABLET ORAL
Qty: 180 TABLET | Refills: 3 | Status: SHIPPED | OUTPATIENT
Start: 2024-01-03

## 2024-01-16 RX ORDER — CLOPIDOGREL BISULFATE 75 MG/1
TABLET ORAL
Qty: 90 TABLET | Refills: 3 | Status: SHIPPED | OUTPATIENT
Start: 2024-01-16

## 2024-03-08 NOTE — PROGRESS NOTES
[facility]   Cardiac Evaluation      Patient: Karan Rich  YOB: 1950  Date: 3/22/24       Chief Complaint   Patient presents with    Hypertension     No cardiac symptoms at this time.    Cardiomyopathy    Hyperlipidemia          Referring provider: Niecy Burleson MD    History of Present Illness:   Mr Rich is seen today for follow up.   He was initially seen for re-evaluation of his coronary disease in 2012. He had  4 stents placed at  in 2005. He states he did not have an MI, did not have chest pain. His symptoms at that time were fatigue. He was transferred to  after having an angiogram at [facility] by Dr Guevara.  Karan's other history includes hypertension and hyperlipidemia.  He does not smoke or drink alcohol.       June 29, 2015 Mr Rich was on vacation. He developed chest pain. He was diagnosed with acute MI and an angiogram was performed with 3 stents placed to LCX and OM2. Angiogram 7/10/15 that revealed EF 40%, LAD - diffuse disease with long stented area in the prox and mid vessel with diffuse instent 40%, distal apical 90%, LCX - 40% ostial, 30% before LCX stent, 40% just prox to large PL branch, bifurcation stents in the LCX-OM3 junction which are patent. OM1 small with 90% ostial, RCA - dom, diffusely diseased, 50%prox, long stented area with localized 60% stenosis, 50% between stents, 2nd stent prox to PDA is patent, PDA - prox 50%. He then had a plain stress test for rehab and participated in rehab.    January, 2018 he was admitted to [facility] after a grossly abnormal gxt. Cath performed then referred for CABG. Dr Bardales operated 1/29/18 w/ CABG x 4 LIMA-LAD, SV-PDA, SV-Diag-OM2.       Today, Mr Rich states \"I feel peachy\". He denies chest pain, palpitations, RUIZ, dizziness, or edema.  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  He is having bruising with his job.       Past Medical History:   has a past medical history of CAD (coronary

## 2024-03-22 ENCOUNTER — OFFICE VISIT (OUTPATIENT)
Dept: CARDIOLOGY CLINIC | Age: 74
End: 2024-03-22
Payer: MEDICARE

## 2024-03-22 VITALS
WEIGHT: 216 LBS | BODY MASS INDEX: 32.74 KG/M2 | HEART RATE: 50 BPM | OXYGEN SATURATION: 99 % | DIASTOLIC BLOOD PRESSURE: 64 MMHG | SYSTOLIC BLOOD PRESSURE: 134 MMHG | HEIGHT: 68 IN

## 2024-03-22 DIAGNOSIS — I25.10 CORONARY ARTERY DISEASE INVOLVING NATIVE CORONARY ARTERY OF NATIVE HEART WITHOUT ANGINA PECTORIS: Primary | ICD-10-CM

## 2024-03-22 DIAGNOSIS — I10 ESSENTIAL HYPERTENSION, BENIGN: ICD-10-CM

## 2024-03-22 DIAGNOSIS — E78.2 MIXED HYPERLIPIDEMIA: ICD-10-CM

## 2024-03-22 PROCEDURE — 1123F ACP DISCUSS/DSCN MKR DOCD: CPT | Performed by: INTERNAL MEDICINE

## 2024-03-22 PROCEDURE — 3078F DIAST BP <80 MM HG: CPT | Performed by: INTERNAL MEDICINE

## 2024-03-22 PROCEDURE — 3075F SYST BP GE 130 - 139MM HG: CPT | Performed by: INTERNAL MEDICINE

## 2024-03-22 PROCEDURE — 99214 OFFICE O/P EST MOD 30 MIN: CPT | Performed by: INTERNAL MEDICINE

## 2024-09-09 ENCOUNTER — OFFICE VISIT (OUTPATIENT)
Dept: PULMONOLOGY | Age: 74
End: 2024-09-09
Payer: MEDICARE

## 2024-09-09 VITALS
BODY MASS INDEX: 34.81 KG/M2 | DIASTOLIC BLOOD PRESSURE: 66 MMHG | SYSTOLIC BLOOD PRESSURE: 114 MMHG | WEIGHT: 221.8 LBS | OXYGEN SATURATION: 98 % | HEART RATE: 64 BPM | HEIGHT: 67 IN

## 2024-09-09 DIAGNOSIS — I25.5 CARDIOMYOPATHY, ISCHEMIC: ICD-10-CM

## 2024-09-09 DIAGNOSIS — I25.10 CORONARY ARTERY DISEASE INVOLVING NATIVE CORONARY ARTERY OF NATIVE HEART WITHOUT ANGINA PECTORIS: ICD-10-CM

## 2024-09-09 DIAGNOSIS — G47.33 OBSTRUCTIVE SLEEP APNEA (ADULT) (PEDIATRIC): Primary | ICD-10-CM

## 2024-09-09 DIAGNOSIS — I10 ESSENTIAL HYPERTENSION, BENIGN: ICD-10-CM

## 2024-09-09 PROCEDURE — 1123F ACP DISCUSS/DSCN MKR DOCD: CPT | Performed by: NURSE PRACTITIONER

## 2024-09-09 PROCEDURE — 99214 OFFICE O/P EST MOD 30 MIN: CPT | Performed by: NURSE PRACTITIONER

## 2024-09-09 PROCEDURE — G2211 COMPLEX E/M VISIT ADD ON: HCPCS | Performed by: NURSE PRACTITIONER

## 2024-09-09 PROCEDURE — 3078F DIAST BP <80 MM HG: CPT | Performed by: NURSE PRACTITIONER

## 2024-09-09 PROCEDURE — 3074F SYST BP LT 130 MM HG: CPT | Performed by: NURSE PRACTITIONER

## 2024-09-09 ASSESSMENT — SLEEP AND FATIGUE QUESTIONNAIRES
HOW LIKELY ARE YOU TO NOD OFF OR FALL ASLEEP WHILE SITTING INACTIVE IN A PUBLIC PLACE: WOULD NEVER DOZE
HOW LIKELY ARE YOU TO NOD OFF OR FALL ASLEEP WHILE SITTING QUIETLY AFTER LUNCH WITHOUT ALCOHOL: WOULD NEVER DOZE
HOW LIKELY ARE YOU TO NOD OFF OR FALL ASLEEP WHILE LYING DOWN TO REST IN THE AFTERNOON WHEN CIRCUMSTANCES PERMIT: WOULD NEVER DOZE
HOW LIKELY ARE YOU TO NOD OFF OR FALL ASLEEP IN A CAR, WHILE STOPPED FOR A FEW MINUTES IN TRAFFIC: WOULD NEVER DOZE
HOW LIKELY ARE YOU TO NOD OFF OR FALL ASLEEP WHEN YOU ARE A PASSENGER IN A CAR FOR AN HOUR WITHOUT A BREAK: WOULD NEVER DOZE
HOW LIKELY ARE YOU TO NOD OFF OR FALL ASLEEP WHILE WATCHING TV: WOULD NEVER DOZE
ESS TOTAL SCORE: 0
HOW LIKELY ARE YOU TO NOD OFF OR FALL ASLEEP WHILE SITTING AND READING: WOULD NEVER DOZE
HOW LIKELY ARE YOU TO NOD OFF OR FALL ASLEEP WHILE SITTING AND TALKING TO SOMEONE: WOULD NEVER DOZE

## 2024-10-01 ENCOUNTER — OFFICE VISIT (OUTPATIENT)
Dept: CARDIOLOGY CLINIC | Age: 74
End: 2024-10-01
Payer: MEDICARE

## 2024-10-01 VITALS
HEART RATE: 50 BPM | HEIGHT: 68 IN | OXYGEN SATURATION: 96 % | SYSTOLIC BLOOD PRESSURE: 134 MMHG | WEIGHT: 220 LBS | BODY MASS INDEX: 33.34 KG/M2 | DIASTOLIC BLOOD PRESSURE: 78 MMHG

## 2024-10-01 DIAGNOSIS — R00.1 BRADYCARDIA: ICD-10-CM

## 2024-10-01 DIAGNOSIS — I25.10 CORONARY ARTERY DISEASE INVOLVING NATIVE CORONARY ARTERY OF NATIVE HEART WITHOUT ANGINA PECTORIS: Primary | ICD-10-CM

## 2024-10-01 DIAGNOSIS — E78.2 MIXED HYPERLIPIDEMIA: ICD-10-CM

## 2024-10-01 DIAGNOSIS — I10 ESSENTIAL HYPERTENSION, BENIGN: ICD-10-CM

## 2024-10-01 PROCEDURE — 3075F SYST BP GE 130 - 139MM HG: CPT | Performed by: INTERNAL MEDICINE

## 2024-10-01 PROCEDURE — 99214 OFFICE O/P EST MOD 30 MIN: CPT | Performed by: INTERNAL MEDICINE

## 2024-10-01 PROCEDURE — 1123F ACP DISCUSS/DSCN MKR DOCD: CPT | Performed by: INTERNAL MEDICINE

## 2024-10-01 PROCEDURE — 93000 ELECTROCARDIOGRAM COMPLETE: CPT | Performed by: INTERNAL MEDICINE

## 2024-10-01 PROCEDURE — 3078F DIAST BP <80 MM HG: CPT | Performed by: INTERNAL MEDICINE

## 2024-10-01 RX ORDER — METHOCARBAMOL 500 MG/1
500 TABLET, FILM COATED ORAL 3 TIMES DAILY PRN
COMMUNITY
Start: 2024-09-23 | End: 2024-10-03

## 2024-10-09 RX ORDER — ROSUVASTATIN CALCIUM 20 MG/1
20 TABLET, COATED ORAL DAILY
Qty: 90 TABLET | Refills: 3 | Status: SHIPPED | OUTPATIENT
Start: 2024-10-09

## 2024-10-11 ENCOUNTER — TELEPHONE (OUTPATIENT)
Dept: CARDIOLOGY CLINIC | Age: 74
End: 2024-10-11

## 2024-10-11 DIAGNOSIS — R39.89 ALTERATION IN RENAL FUNCTION: Primary | ICD-10-CM

## 2024-10-11 NOTE — TELEPHONE ENCOUNTER
Labs received from 10/10/24. BUN/creat 19/1.74. I spoke w/ Karan. He states his pcp has been following this. I advised him I will fax a copy of his labs to Dr Burleson and advised him to follow up with her. Also advised to stay hydrated. I discussed labs with Dr Jarvis and he advises that Karan stay hydrated and repeat BMP in 1 month. Karan states he will go to Dr Burleson's office to repeat his labs. Lab order faxed. Asked that he call with any questions/concerns.

## 2024-12-09 NOTE — TELEPHONE ENCOUNTER
10/1/2024 OV with [facility]    04/14/2025 next OV    10/10/2024 BUN 11/CR 1.74 Pt notified to get labs repeated

## 2024-12-10 RX ORDER — LOSARTAN POTASSIUM 25 MG/1
25 TABLET ORAL DAILY
Qty: 90 TABLET | Refills: 0 | Status: SHIPPED | OUTPATIENT
Start: 2024-12-10

## 2025-01-12 ASSESSMENT — SLEEP AND FATIGUE QUESTIONNAIRES
HOW LIKELY ARE YOU TO NOD OFF OR FALL ASLEEP WHILE SITTING AND READING: WOULD NEVER DOZE
HOW LIKELY ARE YOU TO NOD OFF OR FALL ASLEEP WHEN YOU ARE A PASSENGER IN A CAR FOR AN HOUR WITHOUT A BREAK: WOULD NEVER DOZE
HOW LIKELY ARE YOU TO NOD OFF OR FALL ASLEEP IN A CAR, WHILE STOPPED FOR A FEW MINUTES IN TRAFFIC: WOULD NEVER DOZE
HOW LIKELY ARE YOU TO NOD OFF OR FALL ASLEEP WHILE SITTING QUIETLY AFTER LUNCH WITHOUT ALCOHOL: WOULD NEVER DOZE
ESS TOTAL SCORE: 0
HOW LIKELY ARE YOU TO NOD OFF OR FALL ASLEEP WHILE SITTING AND READING: WOULD NEVER DOZE
HOW LIKELY ARE YOU TO NOD OFF OR FALL ASLEEP WHILE SITTING AND TALKING TO SOMEONE: WOULD NEVER DOZE
HOW LIKELY ARE YOU TO NOD OFF OR FALL ASLEEP WHILE WATCHING TV: WOULD NEVER DOZE
HOW LIKELY ARE YOU TO NOD OFF OR FALL ASLEEP WHILE SITTING INACTIVE IN A PUBLIC PLACE: WOULD NEVER DOZE
HOW LIKELY ARE YOU TO NOD OFF OR FALL ASLEEP WHILE LYING DOWN TO REST IN THE AFTERNOON WHEN CIRCUMSTANCES PERMIT: WOULD NEVER DOZE
HOW LIKELY ARE YOU TO NOD OFF OR FALL ASLEEP WHEN YOU ARE A PASSENGER IN A CAR FOR AN HOUR WITHOUT A BREAK: WOULD NEVER DOZE
HOW LIKELY ARE YOU TO NOD OFF OR FALL ASLEEP WHILE SITTING AND TALKING TO SOMEONE: WOULD NEVER DOZE
HOW LIKELY ARE YOU TO NOD OFF OR FALL ASLEEP WHILE SITTING INACTIVE IN A PUBLIC PLACE: WOULD NEVER DOZE
HOW LIKELY ARE YOU TO NOD OFF OR FALL ASLEEP WHILE WATCHING TV: WOULD NEVER DOZE
HOW LIKELY ARE YOU TO NOD OFF OR FALL ASLEEP IN A CAR, WHILE STOPPED FOR A FEW MINUTES IN TRAFFIC: WOULD NEVER DOZE
HOW LIKELY ARE YOU TO NOD OFF OR FALL ASLEEP WHILE LYING DOWN TO REST IN THE AFTERNOON WHEN CIRCUMSTANCES PERMIT: WOULD NEVER DOZE
HOW LIKELY ARE YOU TO NOD OFF OR FALL ASLEEP WHILE SITTING QUIETLY AFTER LUNCH WITHOUT ALCOHOL: WOULD NEVER DOZE

## 2025-01-13 ENCOUNTER — OFFICE VISIT (OUTPATIENT)
Dept: PULMONOLOGY | Age: 75
End: 2025-01-13
Payer: MEDICARE

## 2025-01-13 VITALS
OXYGEN SATURATION: 97 % | HEART RATE: 64 BPM | WEIGHT: 233.8 LBS | SYSTOLIC BLOOD PRESSURE: 130 MMHG | HEIGHT: 68 IN | DIASTOLIC BLOOD PRESSURE: 84 MMHG | BODY MASS INDEX: 35.43 KG/M2

## 2025-01-13 DIAGNOSIS — G47.33 OBSTRUCTIVE SLEEP APNEA (ADULT) (PEDIATRIC): Primary | ICD-10-CM

## 2025-01-13 DIAGNOSIS — I10 ESSENTIAL HYPERTENSION, BENIGN: ICD-10-CM

## 2025-01-13 DIAGNOSIS — I25.10 CORONARY ARTERY DISEASE INVOLVING NATIVE CORONARY ARTERY OF NATIVE HEART WITHOUT ANGINA PECTORIS: ICD-10-CM

## 2025-01-13 DIAGNOSIS — I25.5 CARDIOMYOPATHY, ISCHEMIC: ICD-10-CM

## 2025-01-13 PROCEDURE — 1160F RVW MEDS BY RX/DR IN RCRD: CPT | Performed by: NURSE PRACTITIONER

## 2025-01-13 PROCEDURE — 3075F SYST BP GE 130 - 139MM HG: CPT | Performed by: NURSE PRACTITIONER

## 2025-01-13 PROCEDURE — 3079F DIAST BP 80-89 MM HG: CPT | Performed by: NURSE PRACTITIONER

## 2025-01-13 PROCEDURE — 1159F MED LIST DOCD IN RCRD: CPT | Performed by: NURSE PRACTITIONER

## 2025-01-13 PROCEDURE — 99214 OFFICE O/P EST MOD 30 MIN: CPT | Performed by: NURSE PRACTITIONER

## 2025-01-13 PROCEDURE — 1123F ACP DISCUSS/DSCN MKR DOCD: CPT | Performed by: NURSE PRACTITIONER

## 2025-01-13 PROCEDURE — G2211 COMPLEX E/M VISIT ADD ON: HCPCS | Performed by: NURSE PRACTITIONER

## 2025-01-13 NOTE — ASSESSMENT & PLAN NOTE
Chronic - Stable: Reviewed and analyzed results of physiologic download from patient's machine and reviewed with patients. Supplies and parts as needed for the machine. These are medically necessary. Limit caffeine use after 3 PM. Based on the analyzed data, we will continue with current settings. Continues to do well with his machine. He is compliant and getting good symptom control. Stable on the current settings and he is getting benefit from using the machine. 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 He will return in one year for follow up. Symptoms to monitor discussed and instructed him to return sooner or contact the office if experiences new or worsening of symptoms. Encouraged him to continue to use his machine each night, all night.

## 2025-01-13 NOTE — PROGRESS NOTES
Diagnosis: [x] ENA (G47.33) [] CSA (G47.31) [] Apnea (G47.30)   Length of Need: [x] 18 Months [] 99 Months [] Other:   Machine (GERRY!): [] Respironics Dream Station   2   Auto [] ResMed AirSense     Auto 11 [] Other:     []  CPAP () [] Bilevel ()   Mode: [] Auto [] Spontaneous    Mode: [] Auto [] Spontaneous             Comfort Settings:      Humidifier: [] Heated ()        [x] Water chamber replacement ()/ 1 per 6 months        Mask:   [x] Nasal () /1 per 3 months [] Full Face () /1 per 3 months   [x] Patient choice -Size and fit mask [] Patient Choice - Size and fit mask   [] Dispense: [] Dispense:   [x] Headgear () / 1 per 3 months [] Headgear () / 1 per 3 months   [x] Replacement Nasal Cushion ()/2 per month [] Interface Replacement ()/1 per month   [x] Replacement Nasal Pillows ()/2 per month         Tubing: [x] Heated ()/1 per 3 months    [] Standard ()/1 per 3 months [] Other:           Filters: [x] Non-disposable ()/1 per 6 months     [x] Ultra-Fine, Disposable ()/2 per month        Miscellaneous: [] Chin Strap ()/ 1 per 6 months [] O2 bleed-in:        LPM   [] Oxymetry on CPAP/Bilevel [x]  Other: Modem: ()         Start Order Date: 25    MEDICAL JUSTIFICATION:  I, the undersigned, certify that the above prescribed supplies are medically necessary for this patient’s wellbeing.  In my opinion, the supplies are both reasonable and necessary in reference to accepted standards of medicalpractice in treatment of this patient’s condition.    Sonya Pacheco CNP    NPI: [id number]     Order Signed Date: 25    Karan Rich  1950  [address] [facility] [address]  [phone] (home)   [phone] (mobile)      Insurance Info (confirm with patient if correct):  Payer/Plan Subscr  Sex Relation Sub. Ins. ID Effective Group Num

## 2025-01-13 NOTE — PROGRESS NOTES
Herminio Pacheco [facility]  [address]  [address]  [place] [address]  F- [phone]     [facility] PHYSICIANS [place] [facility]ARE [facility] SLEEP MEDICINE  [address]  [address]  [facility] [address]  Dept: [phone]  Dept Fax: [phone]  Loc: [phone]       Assessment & Plan  Obstructive sleep apnea (adult) (pediatric)   Chronic - Stable: Reviewed and analyzed results of physiologic download from patient's machine and reviewed with patients. Supplies and parts as needed for the machine. These are medically necessary. Limit caffeine use after 3 PM. Based on the analyzed data, we will continue with current settings. Continues to do well with his machine. He is compliant and getting good symptom control. Stable on the current settings and he is getting benefit from using the machine. 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 He will return in one year for follow up. Symptoms to monitor discussed and instructed him to return sooner or contact the office if experiences new or worsening of symptoms. Encouraged him to continue to use his machine each night, all night.        Coronary artery disease involving native coronary artery of native heart without angina pectoris   Chronic- Stable.  Discussed the importance of treating sleep apnea as part of the management of this disorder.  Cont any meds per PCP and other physicians.       Cardiomyopathy, ischemic   Chronic- Stable.  Discussed the importance of treating sleep apnea as part of the management of this disorder.  Cont any meds per PCP and other physicians.       Essential hypertension, benign   Chronic- Stable.  Discussed the importance of treating sleep apnea as part of the management of this disorder.  Cont any meds per PCP and other physicians.       Reviewed, analyzed, and

## 2025-03-10 RX ORDER — LOSARTAN POTASSIUM 25 MG/1
25 TABLET ORAL DAILY
Qty: 90 TABLET | Refills: 0 | Status: SHIPPED | OUTPATIENT
Start: 2025-03-10

## 2025-03-15 LAB
BUN / CREAT RATIO: 11 (ref 10–24)
BUN BLDV-MCNC: 18 MG/DL (ref 8–27)
CALCIUM SERPL-MCNC: 9 MG/DL (ref 8.6–10.2)
CHLORIDE BLD-SCNC: 107 MMOL/L (ref 96–106)
CO2: 15 MMOL/L (ref 20–29)
CREAT SERPL-MCNC: 1.58 MG/DL (ref 0.76–1.27)
ESTIMATED GLOMERULAR FILTRATION RATE CREATININE EQUATION: 46 ML/MIN/1.73
GLUCOSE BLD-MCNC: 112 MG/DL (ref 70–99)
POTASSIUM SERPL-SCNC: 4.9 MMOL/L (ref 3.5–5.2)
SODIUM BLD-SCNC: 141 MMOL/L (ref 134–144)

## 2025-03-19 ENCOUNTER — TELEPHONE (OUTPATIENT)
Dept: CARDIOLOGY CLINIC | Age: 75
End: 2025-03-19

## 2025-03-19 DIAGNOSIS — I10 ESSENTIAL HYPERTENSION, BENIGN: Primary | Chronic | ICD-10-CM

## 2025-03-19 NOTE — TELEPHONE ENCOUNTER
Pt called for lab results, this was a recheck from lab from 10/10/2024 (Care Everywhere).  Pt states he increased his fluid intake as directed and has been staying hydrated.

## 2025-03-19 NOTE — TELEPHONE ENCOUNTER
Per [facility] - Call pt.  His creat is still a bit high.  Stop the cozaar and monitor his BP at noon everyday for 2 weeks.  Repeat the bmp in one month and if it is not better he will need to see Dr. Montgomery.        I called Karan to discuss.  No answer, left message requesting a return call.      BMP ordered.  Medication list updated.

## 2025-03-19 NOTE — TELEPHONE ENCOUNTER
Call pt. His creat is still a bit high.  Stop the cozaar and monitor his BP at noon everyday for 2 weeks.  Repeat the bmp in one month and if it is not better he will need to see Dr Burns.

## 2025-03-19 NOTE — TELEPHONE ENCOUNTER
Karan returned the call.  Informed him of the above.  He verbalized understanding.  Requesting for lab order to be faxed to the lab at the [facility].  Denied any further questions and/or concerns.

## 2025-03-31 NOTE — PROGRESS NOTES
[facility]   Cardiac Evaluation      Patient: Karan Rich  YOB: 1950  Date: 4/29/25       Chief Complaint   Patient presents with    Hypertension    Hyperlipidemia    Coronary Artery Disease          Referring provider: Niecy Burleson MD    History of Present Illness:   Mr Rich is seen today for follow up.   He was initially seen for re-evaluation of his coronary disease in 2012. He had  4 stents placed at  in 2005. He states he did not have an MI, did not have chest pain. His symptoms at that time were fatigue. He was transferred to  after having an angiogram at [facility] by Dr Guevara.  Karan's other history includes hypertension and hyperlipidemia.  He does not smoke or drink alcohol.       June 29, 2015 Mr Rich was on vacation. He developed chest pain. He was diagnosed with acute MI and an angiogram was performed with 3 stents placed to LCX and OM2. Angiogram 7/10/15 that revealed EF 40%, LAD - diffuse disease with long stented area in the prox and mid vessel with diffuse instent 40%, distal apical 90%, LCX - 40% ostial, 30% before LCX stent, 40% just prox to large PL branch, bifurcation stents in the LCX-OM3 junction which are patent. OM1 small with 90% ostial, RCA - dom, diffusely diseased, 50%prox, long stented area with localized 60% stenosis, 50% between stents, 2nd stent prox to PDA is patent, PDA - prox 50%. He then had a plain stress test for rehab and participated in rehab.    January, 2018 he was admitted to [facility] after a grossly abnormal gxt. Cath performed then referred for CABG. Dr Bardales operated 1/29/18 w/ CABG x 4 LIMA-LAD, SV-PDA, SV-Diag-OM2.       Today, Mr Rich presents with swollen feet. He states he spends a lot of time on his feet. He soaks them nightly. He states his legs have been swelling approx 2 months. PCP started Amlodipine 1 week ago for high blood pressure but the swollen legs preceeded the addition of amlodipine.  I had previously

## 2025-04-23 LAB
BUN BLDV-MCNC: 18 MG/DL
CALCIUM SERPL-MCNC: 9.1 MG/DL
CHLORIDE BLD-SCNC: 105 MMOL/L
CO2: 18 MMOL/L
CREAT SERPL-MCNC: 1.47 MG/DL
EGFR: 49
GLUCOSE BLD-MCNC: 130 MG/DL
MAGNESIUM: 2.1 MG/DL
POTASSIUM SERPL-SCNC: 4.2 MMOL/L
SODIUM BLD-SCNC: 139 MMOL/L
TSH SERPL DL<=0.05 MIU/L-ACNC: 0.59 UIU/ML

## 2025-04-29 ENCOUNTER — OFFICE VISIT (OUTPATIENT)
Dept: CARDIOLOGY CLINIC | Age: 75
End: 2025-04-29
Payer: MEDICARE

## 2025-04-29 VITALS
WEIGHT: 219 LBS | OXYGEN SATURATION: 98 % | SYSTOLIC BLOOD PRESSURE: 136 MMHG | HEART RATE: 73 BPM | HEIGHT: 68 IN | DIASTOLIC BLOOD PRESSURE: 70 MMHG | BODY MASS INDEX: 33.19 KG/M2

## 2025-04-29 DIAGNOSIS — I25.10 CORONARY ARTERY DISEASE INVOLVING NATIVE CORONARY ARTERY OF NATIVE HEART WITHOUT ANGINA PECTORIS: ICD-10-CM

## 2025-04-29 DIAGNOSIS — I10 ESSENTIAL HYPERTENSION, BENIGN: Primary | ICD-10-CM

## 2025-04-29 DIAGNOSIS — E78.2 MIXED HYPERLIPIDEMIA: ICD-10-CM

## 2025-04-29 DIAGNOSIS — R79.89 ELEVATED SERUM CREATININE: ICD-10-CM

## 2025-04-29 PROCEDURE — 1123F ACP DISCUSS/DSCN MKR DOCD: CPT | Performed by: INTERNAL MEDICINE

## 2025-04-29 PROCEDURE — 93000 ELECTROCARDIOGRAM COMPLETE: CPT | Performed by: INTERNAL MEDICINE

## 2025-04-29 PROCEDURE — 3075F SYST BP GE 130 - 139MM HG: CPT | Performed by: INTERNAL MEDICINE

## 2025-04-29 PROCEDURE — 3078F DIAST BP <80 MM HG: CPT | Performed by: INTERNAL MEDICINE

## 2025-04-29 PROCEDURE — 1159F MED LIST DOCD IN RCRD: CPT | Performed by: INTERNAL MEDICINE

## 2025-04-29 PROCEDURE — 99214 OFFICE O/P EST MOD 30 MIN: CPT | Performed by: INTERNAL MEDICINE

## 2025-04-29 RX ORDER — AMLODIPINE BESYLATE 5 MG/1
5 TABLET ORAL DAILY
COMMUNITY
Start: 2025-04-21 | End: 2026-04-21

## 2025-04-29 RX ORDER — FUROSEMIDE 20 MG/1
20 TABLET ORAL DAILY PRN
Qty: 90 TABLET | Refills: 0 | Status: SHIPPED | OUTPATIENT
Start: 2025-04-29

## 2025-07-25 NOTE — TELEPHONE ENCOUNTER
4/29/2025 OV with [facility]    Pt does not have an upcoming apt. Message left for him to call.

## 2025-07-29 RX ORDER — FUROSEMIDE 20 MG/1
TABLET ORAL
Qty: 90 TABLET | Refills: 0 | Status: SHIPPED | OUTPATIENT
Start: 2025-07-29

## (undated) DEVICE — PEN: MARKING STD 100/CS: Brand: MEDICAL ACTION INDUSTRIES

## (undated) DEVICE — NEEDLE SPNL 22GA L5IN BLK HUB S STL W/ QNCKE PNT W/OUT

## (undated) DEVICE — SYRINGE MED 3ML CLR PLAS STD N CTRL LUERLOCK TIP DISP

## (undated) DEVICE — CHLORAPREP 26ML ORANGE

## (undated) DEVICE — MEDIA CONTRAST RX ISOVUE-300 61% 30ML VIALS

## (undated) DEVICE — STANDARD HYPODERMIC NEEDLE,POLYPROPYLENE HUB: Brand: MONOJECT

## (undated) DEVICE — STERILE POLYISOPRENE POWDER-FREE SURGICAL GLOVES: Brand: PROTEXIS

## (undated) DEVICE — UNIVERSAL BLOCK TRAY: Brand: AVANOS*

## (undated) DEVICE — SET EXTN L7IN PRIMING VOL 0.5ML PRSS RATE STD BOR 1 REM

## (undated) DEVICE — Device: Brand: JELCO

## (undated) DEVICE — NEEDLE EPI 22GA L2IN CLR HUB N WNG PERIFIX TUOHY

## (undated) DEVICE — NEEDLE SPNL 22GA L3.5IN BLK HUB S STL REG WALL FIT STYL W/

## (undated) DEVICE — DISPOSABLE OR TOWEL: Brand: CARDINAL HEALTH

## (undated) DEVICE — PORT VLV 2 W NDL FREE SMRTSITE

## (undated) DEVICE — NEEDLE EPI L3.5IN OD20GA CLR POLYCARB HUB WNG N DEHP TUOHY